# Patient Record
Sex: FEMALE | Race: WHITE | HISPANIC OR LATINO | Employment: STUDENT
[De-identification: names, ages, dates, MRNs, and addresses within clinical notes are randomized per-mention and may not be internally consistent; named-entity substitution may affect disease eponyms.]

---
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
## 2021-01-15 ENCOUNTER — BEHAVIORAL HEALTH (OUTPATIENT)
Dept: BEHAVIORAL HEALTH | Age: 21
End: 2021-01-15

## 2021-01-15 DIAGNOSIS — F41.1 GENERALIZED ANXIETY DISORDER: ICD-10-CM

## 2021-01-15 DIAGNOSIS — F33.0 MILD EPISODE OF RECURRENT MAJOR DEPRESSIVE DISORDER (CMD): Primary | ICD-10-CM

## 2021-01-15 PROCEDURE — 90837 PSYTX W PT 60 MINUTES: CPT | Performed by: SOCIAL WORKER

## 2021-01-27 ENCOUNTER — BEHAVIORAL HEALTH (OUTPATIENT)
Dept: BEHAVIORAL HEALTH | Age: 21
End: 2021-01-27

## 2021-01-27 DIAGNOSIS — F33.0 MILD EPISODE OF RECURRENT MAJOR DEPRESSIVE DISORDER (CMD): Primary | ICD-10-CM

## 2021-01-27 DIAGNOSIS — F41.1 GENERALIZED ANXIETY DISORDER: ICD-10-CM

## 2021-01-27 PROCEDURE — 90837 PSYTX W PT 60 MINUTES: CPT | Performed by: SOCIAL WORKER

## 2021-02-10 ENCOUNTER — BEHAVIORAL HEALTH (OUTPATIENT)
Dept: BEHAVIORAL HEALTH | Age: 21
End: 2021-02-10

## 2021-02-10 DIAGNOSIS — F33.0 MILD EPISODE OF RECURRENT MAJOR DEPRESSIVE DISORDER (CMD): Primary | ICD-10-CM

## 2021-02-10 DIAGNOSIS — F41.1 GENERALIZED ANXIETY DISORDER: ICD-10-CM

## 2021-02-10 PROCEDURE — 90834 PSYTX W PT 45 MINUTES: CPT | Performed by: SOCIAL WORKER

## 2021-03-03 ENCOUNTER — BEHAVIORAL HEALTH (OUTPATIENT)
Dept: BEHAVIORAL HEALTH | Age: 21
End: 2021-03-03

## 2021-03-03 DIAGNOSIS — F41.1 GENERALIZED ANXIETY DISORDER: ICD-10-CM

## 2021-03-03 DIAGNOSIS — F33.0 MILD EPISODE OF RECURRENT MAJOR DEPRESSIVE DISORDER (CMD): Primary | ICD-10-CM

## 2021-03-03 PROCEDURE — 90837 PSYTX W PT 60 MINUTES: CPT | Performed by: SOCIAL WORKER

## 2021-03-17 ENCOUNTER — BEHAVIORAL HEALTH (OUTPATIENT)
Dept: BEHAVIORAL HEALTH | Age: 21
End: 2021-03-17

## 2021-03-17 DIAGNOSIS — F33.0 MILD EPISODE OF RECURRENT MAJOR DEPRESSIVE DISORDER (CMD): Primary | ICD-10-CM

## 2021-03-17 DIAGNOSIS — F41.1 GENERALIZED ANXIETY DISORDER: ICD-10-CM

## 2021-03-17 PROCEDURE — 90837 PSYTX W PT 60 MINUTES: CPT | Performed by: SOCIAL WORKER

## 2021-04-16 ENCOUNTER — BEHAVIORAL HEALTH (OUTPATIENT)
Dept: BEHAVIORAL HEALTH | Age: 21
End: 2021-04-16

## 2021-04-16 DIAGNOSIS — F33.0 MILD EPISODE OF RECURRENT MAJOR DEPRESSIVE DISORDER (CMD): Primary | ICD-10-CM

## 2021-04-16 DIAGNOSIS — F41.1 GENERALIZED ANXIETY DISORDER: ICD-10-CM

## 2021-04-16 PROCEDURE — 90837 PSYTX W PT 60 MINUTES: CPT | Performed by: SOCIAL WORKER

## 2021-04-30 ENCOUNTER — BEHAVIORAL HEALTH (OUTPATIENT)
Dept: BEHAVIORAL HEALTH | Age: 21
End: 2021-04-30

## 2021-04-30 DIAGNOSIS — F33.0 MILD EPISODE OF RECURRENT MAJOR DEPRESSIVE DISORDER (CMD): Primary | ICD-10-CM

## 2021-04-30 DIAGNOSIS — F41.1 GENERALIZED ANXIETY DISORDER: ICD-10-CM

## 2021-04-30 PROCEDURE — 90837 PSYTX W PT 60 MINUTES: CPT | Performed by: SOCIAL WORKER

## 2021-05-14 ENCOUNTER — TELEPHONE (OUTPATIENT)
Dept: BEHAVIORAL HEALTH | Age: 21
End: 2021-05-14

## 2021-05-14 ENCOUNTER — BEHAVIORAL HEALTH (OUTPATIENT)
Dept: BEHAVIORAL HEALTH | Age: 21
End: 2021-05-14

## 2021-05-14 DIAGNOSIS — F33.0 MILD EPISODE OF RECURRENT MAJOR DEPRESSIVE DISORDER (CMD): Primary | ICD-10-CM

## 2021-05-14 DIAGNOSIS — F41.1 GENERALIZED ANXIETY DISORDER: ICD-10-CM

## 2021-05-14 PROCEDURE — 90837 PSYTX W PT 60 MINUTES: CPT | Performed by: SOCIAL WORKER

## 2021-05-25 VITALS
OXYGEN SATURATION: 97 % | DIASTOLIC BLOOD PRESSURE: 64 MMHG | RESPIRATION RATE: 16 BRPM | RESPIRATION RATE: 18 BRPM | SYSTOLIC BLOOD PRESSURE: 147 MMHG | TEMPERATURE: 97.9 F | DIASTOLIC BLOOD PRESSURE: 68 MMHG | SYSTOLIC BLOOD PRESSURE: 137 MMHG | OXYGEN SATURATION: 98 % | DIASTOLIC BLOOD PRESSURE: 64 MMHG | TEMPERATURE: 97.2 F | SYSTOLIC BLOOD PRESSURE: 134 MMHG | RESPIRATION RATE: 18 BRPM | HEART RATE: 92 BPM | HEART RATE: 84 BPM | OXYGEN SATURATION: 97 % | HEART RATE: 76 BPM | TEMPERATURE: 97.7 F

## 2021-06-11 ENCOUNTER — BEHAVIORAL HEALTH (OUTPATIENT)
Dept: BEHAVIORAL HEALTH | Age: 21
End: 2021-06-11

## 2021-06-11 DIAGNOSIS — F33.0 MILD EPISODE OF RECURRENT MAJOR DEPRESSIVE DISORDER (CMD): Primary | ICD-10-CM

## 2021-06-11 DIAGNOSIS — F41.1 GENERALIZED ANXIETY DISORDER: ICD-10-CM

## 2021-06-11 PROCEDURE — 90837 PSYTX W PT 60 MINUTES: CPT | Performed by: SOCIAL WORKER

## 2021-06-22 ENCOUNTER — BEHAVIORAL HEALTH (OUTPATIENT)
Dept: BEHAVIORAL HEALTH | Age: 21
End: 2021-06-22

## 2021-06-22 DIAGNOSIS — F33.0 MILD EPISODE OF RECURRENT MAJOR DEPRESSIVE DISORDER (CMD): Primary | ICD-10-CM

## 2021-06-22 DIAGNOSIS — F41.1 GENERALIZED ANXIETY DISORDER: ICD-10-CM

## 2021-06-22 PROCEDURE — 90837 PSYTX W PT 60 MINUTES: CPT | Performed by: SOCIAL WORKER

## 2021-07-06 ENCOUNTER — BEHAVIORAL HEALTH (OUTPATIENT)
Dept: BEHAVIORAL HEALTH | Age: 21
End: 2021-07-06

## 2021-07-06 DIAGNOSIS — F41.1 GENERALIZED ANXIETY DISORDER: ICD-10-CM

## 2021-07-06 DIAGNOSIS — F33.0 MILD EPISODE OF RECURRENT MAJOR DEPRESSIVE DISORDER (CMD): Primary | ICD-10-CM

## 2021-07-06 PROCEDURE — 90837 PSYTX W PT 60 MINUTES: CPT | Performed by: SOCIAL WORKER

## 2021-07-20 ENCOUNTER — BEHAVIORAL HEALTH (OUTPATIENT)
Dept: BEHAVIORAL HEALTH | Age: 21
End: 2021-07-20

## 2021-07-20 DIAGNOSIS — F41.1 GENERALIZED ANXIETY DISORDER: ICD-10-CM

## 2021-07-20 DIAGNOSIS — F33.0 MILD EPISODE OF RECURRENT MAJOR DEPRESSIVE DISORDER (CMD): Primary | ICD-10-CM

## 2021-07-20 PROCEDURE — 90837 PSYTX W PT 60 MINUTES: CPT | Performed by: SOCIAL WORKER

## 2021-07-24 ENCOUNTER — WALK IN (OUTPATIENT)
Dept: URGENT CARE | Age: 21
End: 2021-07-24

## 2021-07-24 VITALS
OXYGEN SATURATION: 98 % | RESPIRATION RATE: 18 BRPM | TEMPERATURE: 99.3 F | SYSTOLIC BLOOD PRESSURE: 137 MMHG | HEART RATE: 103 BPM | DIASTOLIC BLOOD PRESSURE: 82 MMHG

## 2021-07-24 DIAGNOSIS — M54.2 NECK PAIN: Primary | ICD-10-CM

## 2021-07-24 PROCEDURE — 99214 OFFICE O/P EST MOD 30 MIN: CPT | Performed by: FAMILY MEDICINE

## 2021-07-24 RX ORDER — TIZANIDINE 4 MG/1
4 TABLET ORAL EVERY 8 HOURS PRN
Qty: 15 TABLET | Refills: 0 | Status: SHIPPED | OUTPATIENT
Start: 2021-07-24 | End: 2021-07-29

## 2021-07-24 RX ORDER — PREDNISONE 20 MG/1
TABLET ORAL
Qty: 18 TABLET | Refills: 0 | Status: SHIPPED | OUTPATIENT
Start: 2021-07-24 | End: 2021-08-02

## 2021-07-24 ASSESSMENT — PAIN SCALES - GENERAL: PAINLEVEL: 4

## 2022-02-24 ENCOUNTER — WALK IN (OUTPATIENT)
Dept: URGENT CARE | Age: 22
End: 2022-02-24

## 2022-02-24 VITALS
DIASTOLIC BLOOD PRESSURE: 78 MMHG | OXYGEN SATURATION: 98 % | SYSTOLIC BLOOD PRESSURE: 151 MMHG | RESPIRATION RATE: 20 BRPM | TEMPERATURE: 99.1 F | HEART RATE: 95 BPM

## 2022-02-24 DIAGNOSIS — L70.0 CLOSED COMEDONE: Primary | ICD-10-CM

## 2022-02-24 PROCEDURE — 99214 OFFICE O/P EST MOD 30 MIN: CPT | Performed by: EMERGENCY MEDICINE

## 2022-02-24 RX ORDER — SULFAMETHOXAZOLE AND TRIMETHOPRIM 800; 160 MG/1; MG/1
1 TABLET ORAL 2 TIMES DAILY
Qty: 10 TABLET | Refills: 0 | Status: SHIPPED | OUTPATIENT
Start: 2022-02-24 | End: 2022-03-01

## 2022-02-24 ASSESSMENT — PAIN SCALES - GENERAL: PAINLEVEL: 4

## 2023-06-22 ENCOUNTER — WALK IN (OUTPATIENT)
Dept: URGENT CARE | Age: 23
End: 2023-06-22

## 2023-06-22 VITALS
OXYGEN SATURATION: 99 % | DIASTOLIC BLOOD PRESSURE: 63 MMHG | TEMPERATURE: 99.3 F | RESPIRATION RATE: 20 BRPM | HEART RATE: 116 BPM | SYSTOLIC BLOOD PRESSURE: 135 MMHG

## 2023-06-22 DIAGNOSIS — J02.9 PHARYNGITIS, UNSPECIFIED ETIOLOGY: ICD-10-CM

## 2023-06-22 DIAGNOSIS — J02.9 SORE THROAT: Primary | ICD-10-CM

## 2023-06-22 DIAGNOSIS — R50.9 FEVER, UNSPECIFIED FEVER CAUSE: ICD-10-CM

## 2023-06-22 LAB
FLUAV AG UPPER RESP QL IA.RAPID: NEGATIVE
FLUBV AG UPPER RESP QL IA.RAPID: NEGATIVE
INTERNAL PROCEDURAL CONTROLS ACCEPTABLE: YES
S PYO AG THROAT QL IA.RAPID: NEGATIVE
SARS-COV+SARS-COV-2 AG RESP QL IA.RAPID: NOT DETECTED
TEST LOT EXPIRATION DATE: NORMAL
TEST LOT EXPIRATION DATE: NORMAL
TEST LOT NUMBER: NORMAL
TEST LOT NUMBER: NORMAL

## 2023-06-22 PROCEDURE — 87428 SARSCOV & INF VIR A&B AG IA: CPT | Performed by: EMERGENCY MEDICINE

## 2023-06-22 PROCEDURE — 99214 OFFICE O/P EST MOD 30 MIN: CPT | Performed by: EMERGENCY MEDICINE

## 2023-06-22 PROCEDURE — 87880 STREP A ASSAY W/OPTIC: CPT | Performed by: EMERGENCY MEDICINE

## 2023-06-22 RX ORDER — AZITHROMYCIN 500 MG/1
500 TABLET, FILM COATED ORAL DAILY
Qty: 5 TABLET | Refills: 0 | Status: SHIPPED | OUTPATIENT
Start: 2023-06-22 | End: 2023-06-28 | Stop reason: ALTCHOICE

## 2023-06-22 RX ORDER — PREDNISONE 20 MG/1
40 TABLET ORAL DAILY
Qty: 10 TABLET | Refills: 0 | Status: SHIPPED | OUTPATIENT
Start: 2023-06-22 | End: 2023-06-27

## 2023-06-22 ASSESSMENT — PAIN SCALES - GENERAL: PAINLEVEL: 4

## 2023-06-28 ENCOUNTER — WALK IN (OUTPATIENT)
Dept: URGENT CARE | Age: 23
End: 2023-06-28

## 2023-06-28 VITALS
SYSTOLIC BLOOD PRESSURE: 136 MMHG | HEART RATE: 110 BPM | OXYGEN SATURATION: 96 % | DIASTOLIC BLOOD PRESSURE: 80 MMHG | TEMPERATURE: 98.8 F | RESPIRATION RATE: 16 BRPM

## 2023-06-28 DIAGNOSIS — K29.00 ACUTE GASTRITIS, PRESENCE OF BLEEDING UNSPECIFIED, UNSPECIFIED GASTRITIS TYPE: Primary | ICD-10-CM

## 2023-06-28 PROCEDURE — 99214 OFFICE O/P EST MOD 30 MIN: CPT | Performed by: FAMILY MEDICINE

## 2023-06-28 RX ORDER — ONDANSETRON 4 MG/1
4 TABLET, ORALLY DISINTEGRATING ORAL EVERY 8 HOURS PRN
Qty: 10 TABLET | Refills: 0 | Status: SHIPPED | OUTPATIENT
Start: 2023-06-28 | End: 2023-07-03

## 2023-06-28 RX ORDER — NICOTINE POLACRILEX 4 MG/1
20 GUM, CHEWING ORAL DAILY
Qty: 30 TABLET | Refills: 0 | Status: SHIPPED | OUTPATIENT
Start: 2023-06-28 | End: 2023-07-28

## 2023-06-28 ASSESSMENT — PAIN SCALES - GENERAL: PAINLEVEL: 5

## 2023-06-29 ASSESSMENT — ENCOUNTER SYMPTOMS
VOMITING: 0
ABDOMINAL PAIN: 1
SHORTNESS OF BREATH: 0
DIARRHEA: 0
CONSTIPATION: 0
FEVER: 0
NAUSEA: 1
COUGH: 0
FATIGUE: 0
CHILLS: 0

## 2023-06-30 ENCOUNTER — APPOINTMENT (OUTPATIENT)
Dept: ULTRASOUND IMAGING | Facility: HOSPITAL | Age: 23
End: 2023-06-30
Attending: EMERGENCY MEDICINE
Payer: COMMERCIAL

## 2023-06-30 ENCOUNTER — HOSPITAL ENCOUNTER (EMERGENCY)
Facility: HOSPITAL | Age: 23
Discharge: HOME | End: 2023-06-30
Attending: EMERGENCY MEDICINE
Payer: COMMERCIAL

## 2023-06-30 ENCOUNTER — APPOINTMENT (OUTPATIENT)
Dept: GENERAL RADIOLOGY | Facility: HOSPITAL | Age: 23
End: 2023-06-30
Attending: EMERGENCY MEDICINE
Payer: COMMERCIAL

## 2023-06-30 ENCOUNTER — HOSPITAL ENCOUNTER (OUTPATIENT)
Age: 23
Discharge: ANOTHER HOSPITAL | End: 2023-06-30
Attending: EMERGENCY MEDICINE
Payer: COMMERCIAL

## 2023-06-30 VITALS
OXYGEN SATURATION: 95 % | SYSTOLIC BLOOD PRESSURE: 149 MMHG | BODY MASS INDEX: 47.09 KG/M2 | HEART RATE: 78 BPM | DIASTOLIC BLOOD PRESSURE: 76 MMHG | RESPIRATION RATE: 18 BRPM | WEIGHT: 293 LBS | HEIGHT: 66 IN | TEMPERATURE: 98 F

## 2023-06-30 VITALS
WEIGHT: 293 LBS | RESPIRATION RATE: 26 BRPM | DIASTOLIC BLOOD PRESSURE: 63 MMHG | TEMPERATURE: 99 F | HEIGHT: 66 IN | BODY MASS INDEX: 47.09 KG/M2 | OXYGEN SATURATION: 94 % | HEART RATE: 106 BPM | SYSTOLIC BLOOD PRESSURE: 110 MMHG

## 2023-06-30 DIAGNOSIS — B27.90 INFECTIOUS MONONUCLEOSIS WITHOUT COMPLICATION, INFECTIOUS MONONUCLEOSIS DUE TO UNSPECIFIED ORGANISM: Primary | ICD-10-CM

## 2023-06-30 DIAGNOSIS — R10.13 EPIGASTRIC PAIN: ICD-10-CM

## 2023-06-30 DIAGNOSIS — R10.9 ABDOMINAL PAIN OF UNKNOWN ETIOLOGY: Primary | ICD-10-CM

## 2023-06-30 DIAGNOSIS — R50.9 FEVER, UNSPECIFIED FEVER CAUSE: ICD-10-CM

## 2023-06-30 LAB
ALBUMIN SERPL-MCNC: 3.2 G/DL (ref 3.4–5)
ALBUMIN/GLOB SERPL: 0.6 {RATIO} (ref 1–2)
ALP LIVER SERPL-CCNC: 321 U/L
ALT SERPL-CCNC: 276 U/L
ANION GAP SERPL CALC-SCNC: 9 MMOL/L (ref 0–18)
AST SERPL-CCNC: 140 U/L (ref 15–37)
B-HCG UR QL: NEGATIVE
BASOPHILS # BLD AUTO: 0.27 X10(3) UL (ref 0–0.2)
BASOPHILS NFR BLD AUTO: 1.4 %
BILIRUB SERPL-MCNC: 1.1 MG/DL (ref 0.1–2)
BILIRUB UR QL STRIP.AUTO: NEGATIVE
BUN BLD-MCNC: 8 MG/DL (ref 7–18)
CALCIUM BLD-MCNC: 9.1 MG/DL (ref 8.5–10.1)
CHLORIDE SERPL-SCNC: 108 MMOL/L (ref 98–112)
CO2 SERPL-SCNC: 22 MMOL/L (ref 21–32)
CREAT BLD-MCNC: 0.89 MG/DL
EOSINOPHIL # BLD AUTO: 0.04 X10(3) UL (ref 0–0.7)
EOSINOPHIL NFR BLD AUTO: 0.2 %
ERYTHROCYTE [DISTWIDTH] IN BLOOD BY AUTOMATED COUNT: 20 %
FLUAV + FLUBV RNA SPEC NAA+PROBE: NEGATIVE
FLUAV + FLUBV RNA SPEC NAA+PROBE: NEGATIVE
GFR SERPLBLD BASED ON 1.73 SQ M-ARVRAT: 95 ML/MIN/1.73M2 (ref 60–?)
GLOBULIN PLAS-MCNC: 5.2 G/DL (ref 2.8–4.4)
GLUCOSE BLD-MCNC: 106 MG/DL (ref 70–99)
GLUCOSE UR STRIP.AUTO-MCNC: NEGATIVE MG/DL
HCT VFR BLD AUTO: 40 %
HETEROPH AB SER QL: POSITIVE
HGB BLD-MCNC: 12.5 G/DL
IMM GRANULOCYTES # BLD AUTO: 0.17 X10(3) UL (ref 0–1)
IMM GRANULOCYTES NFR BLD: 0.9 %
KETONES UR STRIP.AUTO-MCNC: NEGATIVE MG/DL
LIPASE SERPL-CCNC: 50 U/L (ref 13–75)
LYMPHOCYTES # BLD AUTO: 15.15 X10(3) UL (ref 1–4)
LYMPHOCYTES NFR BLD AUTO: 76.3 %
MCH RBC QN AUTO: 22.3 PG (ref 26–34)
MCHC RBC AUTO-ENTMCNC: 31.3 G/DL (ref 31–37)
MCV RBC AUTO: 71.4 FL
MONOCYTES # BLD AUTO: 0.97 X10(3) UL (ref 0.1–1)
MONOCYTES NFR BLD AUTO: 4.9 %
NEUTROPHILS # BLD AUTO: 3.26 X10 (3) UL (ref 1.5–7.7)
NEUTROPHILS # BLD AUTO: 3.26 X10(3) UL (ref 1.5–7.7)
NEUTROPHILS NFR BLD AUTO: 16.3 %
NITRITE UR QL STRIP.AUTO: NEGATIVE
OSMOLALITY SERPL CALC.SUM OF ELEC: 287 MOSM/KG (ref 275–295)
PH UR STRIP.AUTO: 6 [PH] (ref 5–8)
PLATELET # BLD AUTO: 277 10(3)UL (ref 150–450)
POTASSIUM SERPL-SCNC: 4 MMOL/L (ref 3.5–5.1)
PROT SERPL-MCNC: 8.4 G/DL (ref 6.4–8.2)
PROT UR STRIP.AUTO-MCNC: NEGATIVE MG/DL
RBC # BLD AUTO: 5.6 X10(6)UL
RBC UR QL AUTO: NEGATIVE
RSV RNA SPEC NAA+PROBE: NEGATIVE
SARS-COV-2 RNA RESP QL NAA+PROBE: NOT DETECTED
SODIUM SERPL-SCNC: 139 MMOL/L (ref 136–145)
SP GR UR STRIP.AUTO: 1.01 (ref 1–1.03)
UROBILINOGEN UR STRIP.AUTO-MCNC: 2 MG/DL
WBC # BLD AUTO: 19.9 X10(3) UL (ref 4–11)

## 2023-06-30 PROCEDURE — 81025 URINE PREGNANCY TEST: CPT

## 2023-06-30 PROCEDURE — 99285 EMERGENCY DEPT VISIT HI MDM: CPT

## 2023-06-30 PROCEDURE — 85025 COMPLETE CBC W/AUTO DIFF WBC: CPT | Performed by: EMERGENCY MEDICINE

## 2023-06-30 PROCEDURE — 80053 COMPREHEN METABOLIC PANEL: CPT | Performed by: EMERGENCY MEDICINE

## 2023-06-30 PROCEDURE — 81001 URINALYSIS AUTO W/SCOPE: CPT

## 2023-06-30 PROCEDURE — 81001 URINALYSIS AUTO W/SCOPE: CPT | Performed by: EMERGENCY MEDICINE

## 2023-06-30 PROCEDURE — 86403 PARTICLE AGGLUT ANTBDY SCRN: CPT | Performed by: EMERGENCY MEDICINE

## 2023-06-30 PROCEDURE — 87086 URINE CULTURE/COLONY COUNT: CPT | Performed by: EMERGENCY MEDICINE

## 2023-06-30 PROCEDURE — 81002 URINALYSIS NONAUTO W/O SCOPE: CPT | Performed by: PHYSICIAN ASSISTANT

## 2023-06-30 PROCEDURE — 0241U SARS-COV-2/FLU A AND B/RSV BY PCR (GENEXPERT): CPT | Performed by: EMERGENCY MEDICINE

## 2023-06-30 PROCEDURE — 76700 US EXAM ABDOM COMPLETE: CPT | Performed by: EMERGENCY MEDICINE

## 2023-06-30 PROCEDURE — 71046 X-RAY EXAM CHEST 2 VIEWS: CPT | Performed by: EMERGENCY MEDICINE

## 2023-06-30 PROCEDURE — 83690 ASSAY OF LIPASE: CPT | Performed by: EMERGENCY MEDICINE

## 2023-06-30 PROCEDURE — 96360 HYDRATION IV INFUSION INIT: CPT

## 2023-06-30 RX ORDER — FAMOTIDINE 20 MG/1
20 TABLET, FILM COATED ORAL 2 TIMES DAILY PRN
Qty: 30 TABLET | Refills: 0 | Status: SHIPPED | OUTPATIENT
Start: 2023-06-30 | End: 2023-07-30

## 2023-06-30 RX ORDER — NICOTINE POLACRILEX 4 MG/1
20 GUM, CHEWING ORAL DAILY
COMMUNITY
Start: 2023-06-28 | End: 2023-07-28

## 2023-06-30 RX ORDER — ONDANSETRON 4 MG/1
1 TABLET, ORALLY DISINTEGRATING ORAL EVERY 8 HOURS PRN
COMMUNITY
Start: 2023-06-28 | End: 2023-07-03

## 2023-06-30 NOTE — ED INITIAL ASSESSMENT (HPI)
Pt presents with c/o of R/L upper abdominal pain for the past 2 weeks. Pt went to immediate care last week and was diagnosed with strep. Pt finished abx course. About a week ago, pt started with fevers as high as 102. Pt was sent from immediate care today to R/O gallbladder problems.

## 2023-06-30 NOTE — ED INITIAL ASSESSMENT (HPI)
Patient here with complaints of abdominal pain and intermittent fevers x 2 weeks with fevers low grade . States highest fever was 101-102 about 1 week ago when she also had a sore throat, sinus congestion. States she was seen twice in the last week by urgent care and has had multiple negative COVID, flu, and strep tests but they treated her for strep with z-sumeet and prednisone. States abdominal pain with movements/walking, lying on her stomach, and deep breath makes her feel like she has abdominal muscle spasms. States abdominal pain goes from the upper abdomen to the umbilical area. States she had a GI Cocktail about 2 days ago and the abdominal pain and fever resolved for about 1 day but then returned. Dr. Josafat Simmons at bedside assessing.

## 2023-07-04 ENCOUNTER — HOSPITAL ENCOUNTER (EMERGENCY)
Facility: HOSPITAL | Age: 23
Discharge: HOME | End: 2023-07-04
Attending: STUDENT IN AN ORGANIZED HEALTH CARE EDUCATION/TRAINING PROGRAM
Payer: COMMERCIAL

## 2023-07-04 VITALS
WEIGHT: 293 LBS | RESPIRATION RATE: 16 BRPM | HEIGHT: 66 IN | DIASTOLIC BLOOD PRESSURE: 76 MMHG | SYSTOLIC BLOOD PRESSURE: 138 MMHG | TEMPERATURE: 98 F | OXYGEN SATURATION: 97 % | HEART RATE: 97 BPM | BODY MASS INDEX: 47.09 KG/M2

## 2023-07-04 DIAGNOSIS — R74.01 TRANSAMINITIS: ICD-10-CM

## 2023-07-04 DIAGNOSIS — B27.90 INFECTIOUS MONONUCLEOSIS WITHOUT COMPLICATION, INFECTIOUS MONONUCLEOSIS DUE TO UNSPECIFIED ORGANISM: Primary | ICD-10-CM

## 2023-07-04 LAB
ALBUMIN SERPL-MCNC: 3.2 G/DL (ref 3.4–5)
ALBUMIN/GLOB SERPL: 0.7 {RATIO} (ref 1–2)
ALP LIVER SERPL-CCNC: 380 U/L
ALT SERPL-CCNC: 186 U/L
ANION GAP SERPL CALC-SCNC: 9 MMOL/L (ref 0–18)
AST SERPL-CCNC: 117 U/L (ref 15–37)
BASOPHILS # BLD AUTO: 0.39 X10(3) UL (ref 0–0.2)
BASOPHILS NFR BLD AUTO: 1.8 %
BILIRUB SERPL-MCNC: 0.7 MG/DL (ref 0.1–2)
BUN BLD-MCNC: 9 MG/DL (ref 7–18)
CALCIUM BLD-MCNC: 9.3 MG/DL (ref 8.5–10.1)
CHLORIDE SERPL-SCNC: 108 MMOL/L (ref 98–112)
CO2 SERPL-SCNC: 22 MMOL/L (ref 21–32)
CREAT BLD-MCNC: 0.87 MG/DL
EOSINOPHIL # BLD AUTO: 0.03 X10(3) UL (ref 0–0.7)
EOSINOPHIL NFR BLD AUTO: 0.1 %
ERYTHROCYTE [DISTWIDTH] IN BLOOD BY AUTOMATED COUNT: 21.4 %
GFR SERPLBLD BASED ON 1.73 SQ M-ARVRAT: 97 ML/MIN/1.73M2 (ref 60–?)
GLOBULIN PLAS-MCNC: 4.8 G/DL (ref 2.8–4.4)
GLUCOSE BLD-MCNC: 100 MG/DL (ref 70–99)
HCT VFR BLD AUTO: 41.3 %
HGB BLD-MCNC: 12.6 G/DL
IMM GRANULOCYTES # BLD AUTO: 0.19 X10(3) UL (ref 0–1)
IMM GRANULOCYTES NFR BLD: 0.9 %
LYMPHOCYTES # BLD AUTO: 17.56 X10(3) UL (ref 1–4)
LYMPHOCYTES NFR BLD AUTO: 79 %
MCH RBC QN AUTO: 22.1 PG (ref 26–34)
MCHC RBC AUTO-ENTMCNC: 30.5 G/DL (ref 31–37)
MCV RBC AUTO: 72.6 FL
MONOCYTES # BLD AUTO: 1.84 X10(3) UL (ref 0.1–1)
MONOCYTES NFR BLD AUTO: 8.3 %
NEUTROPHILS # BLD AUTO: 2.23 X10 (3) UL (ref 1.5–7.7)
NEUTROPHILS # BLD AUTO: 2.23 X10(3) UL (ref 1.5–7.7)
NEUTROPHILS NFR BLD AUTO: 9.9 %
OSMOLALITY SERPL CALC.SUM OF ELEC: 287 MOSM/KG (ref 275–295)
PLATELET # BLD AUTO: 275 10(3)UL (ref 150–450)
POTASSIUM SERPL-SCNC: 4 MMOL/L (ref 3.5–5.1)
PROT SERPL-MCNC: 8 G/DL (ref 6.4–8.2)
RBC # BLD AUTO: 5.69 X10(6)UL
SODIUM SERPL-SCNC: 139 MMOL/L (ref 136–145)
WBC # BLD AUTO: 22.2 X10(3) UL (ref 4–11)

## 2023-07-04 PROCEDURE — 36415 COLL VENOUS BLD VENIPUNCTURE: CPT

## 2023-07-04 PROCEDURE — 85025 COMPLETE CBC W/AUTO DIFF WBC: CPT | Performed by: STUDENT IN AN ORGANIZED HEALTH CARE EDUCATION/TRAINING PROGRAM

## 2023-07-04 PROCEDURE — 80053 COMPREHEN METABOLIC PANEL: CPT | Performed by: STUDENT IN AN ORGANIZED HEALTH CARE EDUCATION/TRAINING PROGRAM

## 2023-07-04 PROCEDURE — 99283 EMERGENCY DEPT VISIT LOW MDM: CPT

## 2023-07-04 NOTE — ED INITIAL ASSESSMENT (HPI)
PT states that she was contacted by the E.D. physician after her visit to this E.D. a few days ago.  PT states that she was diagnosed with Mononucleosis and that she was informed that additional blood work is to be completed at this facility

## 2023-07-21 RX ORDER — OMEPRAZOLE 20 MG/1
CAPSULE, DELAYED RELEASE ORAL
Qty: 30 CAPSULE | OUTPATIENT
Start: 2023-07-21

## 2023-08-01 ENCOUNTER — OFFICE VISIT (OUTPATIENT)
Facility: CLINIC | Age: 23
End: 2023-08-01
Payer: COMMERCIAL

## 2023-08-01 VITALS
DIASTOLIC BLOOD PRESSURE: 84 MMHG | BODY MASS INDEX: 47.09 KG/M2 | HEIGHT: 66 IN | SYSTOLIC BLOOD PRESSURE: 128 MMHG | HEART RATE: 111 BPM | WEIGHT: 293 LBS

## 2023-08-01 DIAGNOSIS — Z01.419 ENCOUNTER FOR WELL WOMAN EXAM WITH ROUTINE GYNECOLOGICAL EXAM: Primary | ICD-10-CM

## 2023-08-01 DIAGNOSIS — Z12.4 CERVICAL CANCER SCREENING: ICD-10-CM

## 2023-08-01 PROCEDURE — 3008F BODY MASS INDEX DOCD: CPT | Performed by: OBSTETRICS & GYNECOLOGY

## 2023-08-01 PROCEDURE — 87591 N.GONORRHOEAE DNA AMP PROB: CPT | Performed by: OBSTETRICS & GYNECOLOGY

## 2023-08-01 PROCEDURE — 87491 CHLMYD TRACH DNA AMP PROBE: CPT | Performed by: OBSTETRICS & GYNECOLOGY

## 2023-08-01 PROCEDURE — 3079F DIAST BP 80-89 MM HG: CPT | Performed by: OBSTETRICS & GYNECOLOGY

## 2023-08-01 PROCEDURE — 99385 PREV VISIT NEW AGE 18-39: CPT | Performed by: OBSTETRICS & GYNECOLOGY

## 2023-08-01 PROCEDURE — 3074F SYST BP LT 130 MM HG: CPT | Performed by: OBSTETRICS & GYNECOLOGY

## 2023-08-01 NOTE — PROGRESS NOTES
Ifeanyi Morris is a 26year old female  Patient's last menstrual period was 2023 (exact date). Patient presents with:  Wellness Visit: First GYNE visit  Irregular periods  . Patient has no complaints, menses every 1-2 months, declines b.c.    OBSTETRICS HISTORY:  OB History    Para Term  AB Living   0 0 0 0 0 0   SAB IAB Ectopic Multiple Live Births   0 0 0 0 0       GYNE HISTORY:  Periods irregular light      Sexual activity:   Yes      Partners:   Male      Birth control/ protection:   Condom        Pap Result Notes: no pap yet        MEDICAL HISTORY:  History reviewed. No pertinent past medical history.     SURGICAL HISTORY:  Past Surgical History:   Procedure Laterality Date    Tonsillectomy Bilateral        SOCIAL HISTORY:  Social History    Socioeconomic History      Marital status: Single      Spouse name: Not on file      Number of children: Not on file      Years of education: Not on file      Highest education level: Not on file    Occupational History      Not on file    Tobacco Use      Smoking status: Never      Smokeless tobacco: Never    Vaping Use      Vaping Use: Never used    Substance and Sexual Activity      Alcohol use: Yes        Comment: socially      Drug use: Never      Sexual activity: Yes        Partners: Male        Birth control/protection: Condom    Other Topics      Concerns:        Caffeine Concern: No        Exercise: Yes        Seat Belt: No        Special Diet: No        Stress Concern: Yes        Weight Concern: Yes    Social History Narrative      Not on file    Social Determinants of Health  Financial Resource Strain: Not on file  Food Insecurity: Not on file  Transportation Needs: Not on file  Physical Activity: Not on file  Stress: Not on file  Social Connections: Not on file  Housing Stability: Not on file    FAMILY HISTORY:  Family History   Problem Relation Age of Onset    Breast Cancer Paternal Aunt        MEDICATIONS:  No current outpatient medications on file. ALLERGIES:    Clavulanic Acid         HIVES    Comment:Mom is unsure if it was the augmentin or the food             (mushroons) she ate. Tested and not allergic to             PCN      Review of Systems:  Constitutional:  Denies fatigue, night sweats, hot flashes  Eyes:  denies blurred or double vision  Cardiovascular:  denies chest pain or palpitations  Respiratory:  denies shortness of breath  Gastrointestinal:  denies heartburn, abdominal pain, diarrhea or constipation  Genitourinary:  denies dysuria, incontinence, abnormal vaginal discharge, vaginal itching  Musculoskeletal:  denies back pain. Skin/Breast:  Denies any breast pain, lumps, or discharge. Neurological:  denies headaches, extremity weakness or numbness. Psychiatric: denies depression or anxiety. Endocrine:   denies excessive thirst or urination. Heme/Lymph:  denies history of anemia, easy bruising or bleeding. PHYSICAL EXAM:   Constitutional: well developed, well nourished  Head/Face: normocephalic  Neck/Thyroid: thyroid symmetric, no thyromegaly, no nodules, no adenopathy  Lymphatic:no abnormal supraclavicular or axillary adenopathy is noted  Breast: normal without palpable masses, tenderness, asymmetry, nipple discharge, nipple retraction or skin changes  Abdomen:  soft, nontender, nondistended, no masses  Skin/Hair: no unusual rashes or bruises  Extremities: no edema, no cyanosis  Psychiatric:  Oriented to time, place, person and situation.  Appropriate mood and affect    Pelvic Exam:  External Genitalia: normal appearance, hair distribution, and no lesions  Urethral Meatus:  normal in size, location, without lesions and prolapse  Bladder:  No fullness, masses or tenderness  Vagina:  Normal appearance without lesions, no abnormal discharge  Cervix:  Normal without tenderness on motion  Uterus: normal in size, contour, position, mobility, without tenderness  Adnexa: normal without masses or tenderness  Perineum: normal  Anus: no hemorroids     Assessment & Plan:  Diagnoses and all orders for this visit:    Encounter for well woman exam with routine gynecological exam  -     CHLAMYDIA/GONOCOCCUS, MIMI;  Future    Cervical cancer screening  -     THINPREP PAP WITH HPV REFLEX REQUEST B; Future

## 2023-08-02 LAB
C TRACH DNA SPEC QL NAA+PROBE: NEGATIVE
N GONORRHOEA DNA SPEC QL NAA+PROBE: NEGATIVE

## 2023-08-07 ENCOUNTER — LAB ENCOUNTER (OUTPATIENT)
Dept: LAB | Facility: HOSPITAL | Age: 23
End: 2023-08-07
Attending: OBSTETRICS & GYNECOLOGY
Payer: COMMERCIAL

## 2023-08-07 DIAGNOSIS — Z01.419 ENCOUNTER FOR WELL WOMAN EXAM WITH ROUTINE GYNECOLOGICAL EXAM: ICD-10-CM

## 2023-08-07 LAB
ALBUMIN SERPL-MCNC: 3.8 G/DL (ref 3.4–5)
ALBUMIN/GLOB SERPL: 0.8 {RATIO} (ref 1–2)
ALP LIVER SERPL-CCNC: 107 U/L
ALT SERPL-CCNC: 42 U/L
ANION GAP SERPL CALC-SCNC: 7 MMOL/L (ref 0–18)
AST SERPL-CCNC: 27 U/L (ref 15–37)
BASOPHILS # BLD AUTO: 0.06 X10(3) UL (ref 0–0.2)
BASOPHILS NFR BLD AUTO: 0.6 %
BILIRUB SERPL-MCNC: 0.6 MG/DL (ref 0.1–2)
BUN BLD-MCNC: 10 MG/DL (ref 7–18)
CALCIUM BLD-MCNC: 9.2 MG/DL (ref 8.5–10.1)
CHLORIDE SERPL-SCNC: 107 MMOL/L (ref 98–112)
CO2 SERPL-SCNC: 25 MMOL/L (ref 21–32)
CREAT BLD-MCNC: 0.8 MG/DL
EGFRCR SERPLBLD CKD-EPI 2021: 107 ML/MIN/1.73M2 (ref 60–?)
EOSINOPHIL # BLD AUTO: 0.16 X10(3) UL (ref 0–0.7)
EOSINOPHIL NFR BLD AUTO: 1.6 %
ERYTHROCYTE [DISTWIDTH] IN BLOOD BY AUTOMATED COUNT: 16.2 %
FASTING STATUS PATIENT QL REPORTED: YES
GLOBULIN PLAS-MCNC: 4.5 G/DL (ref 2.8–4.4)
GLUCOSE BLD-MCNC: 92 MG/DL (ref 70–99)
HCT VFR BLD AUTO: 40 %
HGB BLD-MCNC: 12.5 G/DL
IMM GRANULOCYTES # BLD AUTO: 0.04 X10(3) UL (ref 0–1)
IMM GRANULOCYTES NFR BLD: 0.4 %
LYMPHOCYTES # BLD AUTO: 4.19 X10(3) UL (ref 1–4)
LYMPHOCYTES NFR BLD AUTO: 41.6 %
MCH RBC QN AUTO: 22.3 PG (ref 26–34)
MCHC RBC AUTO-ENTMCNC: 31.3 G/DL (ref 31–37)
MCV RBC AUTO: 71.4 FL
MONOCYTES # BLD AUTO: 0.66 X10(3) UL (ref 0.1–1)
MONOCYTES NFR BLD AUTO: 6.6 %
NEUTROPHILS # BLD AUTO: 4.96 X10 (3) UL (ref 1.5–7.7)
NEUTROPHILS # BLD AUTO: 4.96 X10(3) UL (ref 1.5–7.7)
NEUTROPHILS NFR BLD AUTO: 49.2 %
OSMOLALITY SERPL CALC.SUM OF ELEC: 287 MOSM/KG (ref 275–295)
PLATELET # BLD AUTO: 472 10(3)UL (ref 150–450)
POTASSIUM SERPL-SCNC: 4.1 MMOL/L (ref 3.5–5.1)
PROT SERPL-MCNC: 8.3 G/DL (ref 6.4–8.2)
RBC # BLD AUTO: 5.6 X10(6)UL
SODIUM SERPL-SCNC: 139 MMOL/L (ref 136–145)
TSI SER-ACNC: 2.67 MIU/ML (ref 0.36–3.74)
WBC # BLD AUTO: 10.1 X10(3) UL (ref 4–11)

## 2023-08-07 PROCEDURE — 85025 COMPLETE CBC W/AUTO DIFF WBC: CPT

## 2023-08-07 PROCEDURE — 80053 COMPREHEN METABOLIC PANEL: CPT

## 2023-08-07 PROCEDURE — 36415 COLL VENOUS BLD VENIPUNCTURE: CPT

## 2023-08-07 PROCEDURE — 84443 ASSAY THYROID STIM HORMONE: CPT

## 2023-08-08 ENCOUNTER — TELEPHONE (OUTPATIENT)
Facility: CLINIC | Age: 23
End: 2023-08-08

## 2023-08-08 DIAGNOSIS — D69.1 ABNORMAL PLATELETS (HCC): Primary | ICD-10-CM

## 2023-08-08 DIAGNOSIS — D75.839 THROMBOCYTOSIS: ICD-10-CM

## 2023-08-08 NOTE — PROGRESS NOTES
Pt aware of results & recommendations to repeat CBC in 4 weeks. Order pended. Verbalized understanding.

## 2023-09-29 ENCOUNTER — APPOINTMENT (OUTPATIENT)
Dept: INTERNAL MEDICINE | Age: 23
End: 2023-09-29

## 2023-10-24 ENCOUNTER — OFFICE VISIT (OUTPATIENT)
Dept: INTERNAL MEDICINE CLINIC | Facility: CLINIC | Age: 23
End: 2023-10-24

## 2023-10-24 ENCOUNTER — APPOINTMENT (OUTPATIENT)
Dept: INTERNAL MEDICINE | Age: 23
End: 2023-10-24

## 2023-10-24 VITALS
BODY MASS INDEX: 47.65 KG/M2 | RESPIRATION RATE: 20 BRPM | HEIGHT: 65.75 IN | HEART RATE: 88 BPM | WEIGHT: 293 LBS | DIASTOLIC BLOOD PRESSURE: 70 MMHG | TEMPERATURE: 98 F | OXYGEN SATURATION: 97 % | SYSTOLIC BLOOD PRESSURE: 128 MMHG

## 2023-10-24 DIAGNOSIS — F41.9 ANXIETY: ICD-10-CM

## 2023-10-24 DIAGNOSIS — F50.81 BINGE EATING DISORDER: ICD-10-CM

## 2023-10-24 DIAGNOSIS — E66.01 CLASS 3 SEVERE OBESITY DUE TO EXCESS CALORIES WITHOUT SERIOUS COMORBIDITY WITH BODY MASS INDEX (BMI) OF 50.0 TO 59.9 IN ADULT (HCC): Primary | Chronic | ICD-10-CM

## 2023-10-24 DIAGNOSIS — D75.839 THROMBOCYTOSIS: ICD-10-CM

## 2023-10-24 DIAGNOSIS — R71.8 MICROCYTOSIS: ICD-10-CM

## 2023-10-24 PROBLEM — E66.813 CLASS 3 SEVERE OBESITY DUE TO EXCESS CALORIES WITHOUT SERIOUS COMORBIDITY WITH BODY MASS INDEX (BMI) OF 50.0 TO 59.9 IN ADULT: Status: ACTIVE | Noted: 2023-10-24

## 2023-10-24 PROBLEM — E66.813 CLASS 3 SEVERE OBESITY DUE TO EXCESS CALORIES WITHOUT SERIOUS COMORBIDITY WITH BODY MASS INDEX (BMI) OF 50.0 TO 59.9 IN ADULT: Chronic | Status: ACTIVE | Noted: 2023-10-24

## 2023-10-24 PROBLEM — E66.813 CLASS 3 SEVERE OBESITY DUE TO EXCESS CALORIES WITHOUT SERIOUS COMORBIDITY WITH BODY MASS INDEX (BMI) OF 50.0 TO 59.9 IN ADULT (HCC): Status: ACTIVE | Noted: 2023-10-24

## 2023-10-24 PROBLEM — E66.813 CLASS 3 SEVERE OBESITY DUE TO EXCESS CALORIES WITHOUT SERIOUS COMORBIDITY WITH BODY MASS INDEX (BMI) OF 50.0 TO 59.9 IN ADULT (HCC): Chronic | Status: ACTIVE | Noted: 2023-10-24

## 2023-10-24 PROCEDURE — 3078F DIAST BP <80 MM HG: CPT | Performed by: STUDENT IN AN ORGANIZED HEALTH CARE EDUCATION/TRAINING PROGRAM

## 2023-10-24 PROCEDURE — 99204 OFFICE O/P NEW MOD 45 MIN: CPT | Performed by: STUDENT IN AN ORGANIZED HEALTH CARE EDUCATION/TRAINING PROGRAM

## 2023-10-24 PROCEDURE — 3074F SYST BP LT 130 MM HG: CPT | Performed by: STUDENT IN AN ORGANIZED HEALTH CARE EDUCATION/TRAINING PROGRAM

## 2023-10-24 PROCEDURE — 3008F BODY MASS INDEX DOCD: CPT | Performed by: STUDENT IN AN ORGANIZED HEALTH CARE EDUCATION/TRAINING PROGRAM

## 2023-10-24 RX ORDER — FLUOXETINE 10 MG/1
10 CAPSULE ORAL DAILY
Qty: 30 CAPSULE | Refills: 0 | Status: SHIPPED | OUTPATIENT
Start: 2023-10-24 | End: 2023-11-23

## 2023-10-24 NOTE — PATIENT INSTRUCTIONS
Headspace charmaine for mindfulness, Yoga     Food diary for management of diet and to work through with therapist.     Fluoxetine for anxiety

## 2023-10-27 ENCOUNTER — LAB ENCOUNTER (OUTPATIENT)
Dept: LAB | Facility: HOSPITAL | Age: 23
End: 2023-10-27
Attending: STUDENT IN AN ORGANIZED HEALTH CARE EDUCATION/TRAINING PROGRAM

## 2023-10-27 DIAGNOSIS — D75.839 THROMBOCYTOSIS: ICD-10-CM

## 2023-10-27 DIAGNOSIS — E66.01 CLASS 3 SEVERE OBESITY DUE TO EXCESS CALORIES WITHOUT SERIOUS COMORBIDITY WITH BODY MASS INDEX (BMI) OF 50.0 TO 59.9 IN ADULT (HCC): Chronic | ICD-10-CM

## 2023-10-27 DIAGNOSIS — R71.8 MICROCYTOSIS: ICD-10-CM

## 2023-10-27 LAB
BASOPHILS # BLD AUTO: 0.05 X10(3) UL (ref 0–0.2)
BASOPHILS NFR BLD AUTO: 0.4 %
CHOLEST SERPL-MCNC: 145 MG/DL (ref ?–200)
DEPRECATED HBV CORE AB SER IA-ACNC: 33.4 NG/ML
EOSINOPHIL # BLD AUTO: 0.14 X10(3) UL (ref 0–0.7)
EOSINOPHIL NFR BLD AUTO: 1.2 %
ERYTHROCYTE [DISTWIDTH] IN BLOOD BY AUTOMATED COUNT: 18.2 %
FASTING PATIENT LIPID ANSWER: YES
HCT VFR BLD AUTO: 39.9 %
HDLC SERPL-MCNC: 38 MG/DL (ref 40–59)
HGB BLD-MCNC: 12.1 G/DL
IMM GRANULOCYTES # BLD AUTO: 0.07 X10(3) UL (ref 0–1)
IMM GRANULOCYTES NFR BLD: 0.6 %
IRON SATN MFR SERPL: 5 %
IRON SERPL-MCNC: 23 UG/DL
LDLC SERPL CALC-MCNC: 75 MG/DL (ref ?–100)
LYMPHOCYTES # BLD AUTO: 3.81 X10(3) UL (ref 1–4)
LYMPHOCYTES NFR BLD AUTO: 32.5 %
MCH RBC QN AUTO: 21.8 PG (ref 26–34)
MCHC RBC AUTO-ENTMCNC: 30.3 G/DL (ref 31–37)
MCV RBC AUTO: 72 FL
MONOCYTES # BLD AUTO: 0.66 X10(3) UL (ref 0.1–1)
MONOCYTES NFR BLD AUTO: 5.6 %
NEUTROPHILS # BLD AUTO: 6.99 X10 (3) UL (ref 1.5–7.7)
NEUTROPHILS # BLD AUTO: 6.99 X10(3) UL (ref 1.5–7.7)
NEUTROPHILS NFR BLD AUTO: 59.7 %
NONHDLC SERPL-MCNC: 107 MG/DL (ref ?–130)
PLATELET # BLD AUTO: 472 10(3)UL (ref 150–450)
RBC # BLD AUTO: 5.54 X10(6)UL
TIBC SERPL-MCNC: 434 UG/DL (ref 240–450)
TRANSFERRIN SERPL-MCNC: 291 MG/DL (ref 200–360)
TRIGL SERPL-MCNC: 191 MG/DL (ref 30–149)
VLDLC SERPL CALC-MCNC: 30 MG/DL (ref 0–30)
WBC # BLD AUTO: 11.7 X10(3) UL (ref 4–11)

## 2023-10-27 PROCEDURE — 83540 ASSAY OF IRON: CPT

## 2023-10-27 PROCEDURE — 36415 COLL VENOUS BLD VENIPUNCTURE: CPT

## 2023-10-27 PROCEDURE — 80061 LIPID PANEL: CPT

## 2023-10-27 PROCEDURE — 82728 ASSAY OF FERRITIN: CPT

## 2023-10-27 PROCEDURE — 83550 IRON BINDING TEST: CPT

## 2023-10-27 PROCEDURE — 85025 COMPLETE CBC W/AUTO DIFF WBC: CPT

## 2023-10-28 DIAGNOSIS — E61.1 IRON DEFICIENCY: ICD-10-CM

## 2023-10-28 DIAGNOSIS — D75.839 THROMBOCYTOSIS: Primary | ICD-10-CM

## 2023-11-16 PROBLEM — E61.1 IRON DEFICIENCY: Status: ACTIVE | Noted: 2023-11-16

## 2023-11-16 PROBLEM — R71.8 MICROCYTOSIS: Status: ACTIVE | Noted: 2023-11-16

## 2023-11-16 PROBLEM — F41.1 GAD (GENERALIZED ANXIETY DISORDER): Status: ACTIVE | Noted: 2023-11-16

## 2023-11-16 PROBLEM — D75.839 THROMBOCYTOSIS: Status: ACTIVE | Noted: 2023-11-16

## 2023-11-27 RX ORDER — FLUOXETINE 10 MG/1
10 CAPSULE ORAL DAILY
Qty: 90 CAPSULE | Refills: 0 | Status: SHIPPED | OUTPATIENT
Start: 2023-11-27

## 2023-11-27 RX ORDER — FLUOXETINE 10 MG/1
10 CAPSULE ORAL DAILY
Qty: 30 CAPSULE | Refills: 0 | OUTPATIENT
Start: 2023-11-27

## 2023-11-27 NOTE — TELEPHONE ENCOUNTER
Last OV relevant to medication: 11/16/23  Last refill date: 10/24/23 #30/refills: 0  When pt was asked to return for OV: 2/16/24  Upcoming appt/reason:   Future Appointments   Date Time Provider Nicole Ferrera   2/19/2024  1:30 PM Ventura Mckenzie MD [facility]   Was pt informed of any over due labs: due in [place]

## 2024-02-21 ENCOUNTER — LAB ENCOUNTER (OUTPATIENT)
Dept: LAB | Facility: HOSPITAL | Age: 24
End: 2024-02-21
Attending: STUDENT IN AN ORGANIZED HEALTH CARE EDUCATION/TRAINING PROGRAM
Payer: COMMERCIAL

## 2024-02-21 DIAGNOSIS — D69.1 ABNORMAL PLATELETS (HCC): ICD-10-CM

## 2024-02-21 DIAGNOSIS — E61.1 LOW IRON: ICD-10-CM

## 2024-02-21 DIAGNOSIS — E61.1 IRON DEFICIENCY: ICD-10-CM

## 2024-02-21 DIAGNOSIS — D75.839 THROMBOCYTOSIS: ICD-10-CM

## 2024-02-21 LAB
BASOPHILS # BLD AUTO: 0.05 X10(3) UL (ref 0–0.2)
BASOPHILS NFR BLD AUTO: 0.5 %
DEPRECATED HBV CORE AB SER IA-ACNC: 23.4 NG/ML
EOSINOPHIL # BLD AUTO: 0.15 X10(3) UL (ref 0–0.7)
EOSINOPHIL NFR BLD AUTO: 1.5 %
ERYTHROCYTE [DISTWIDTH] IN BLOOD BY AUTOMATED COUNT: 18 %
HCT VFR BLD AUTO: 39.8 %
HGB BLD-MCNC: 11.9 G/DL
IMM GRANULOCYTES # BLD AUTO: 0.05 X10(3) UL (ref 0–1)
IMM GRANULOCYTES NFR BLD: 0.5 %
IRON SATN MFR SERPL: 7 %
IRON SERPL-MCNC: 26 UG/DL
LYMPHOCYTES # BLD AUTO: 3.19 X10(3) UL (ref 1–4)
LYMPHOCYTES NFR BLD AUTO: 31.1 %
MCH RBC QN AUTO: 21.6 PG (ref 26–34)
MCHC RBC AUTO-ENTMCNC: 29.9 G/DL (ref 31–37)
MCV RBC AUTO: 72.4 FL
MONOCYTES # BLD AUTO: 0.62 X10(3) UL (ref 0.1–1)
MONOCYTES NFR BLD AUTO: 6 %
NEUTROPHILS # BLD AUTO: 6.2 X10 (3) UL (ref 1.5–7.7)
NEUTROPHILS # BLD AUTO: 6.2 X10(3) UL (ref 1.5–7.7)
NEUTROPHILS NFR BLD AUTO: 60.4 %
PLATELET # BLD AUTO: 489 10(3)UL (ref 150–450)
RBC # BLD AUTO: 5.5 X10(6)UL
TIBC SERPL-MCNC: 380 UG/DL (ref 240–450)
TRANSFERRIN SERPL-MCNC: 255 MG/DL (ref 200–360)
WBC # BLD AUTO: 10.3 X10(3) UL (ref 4–11)

## 2024-02-21 PROCEDURE — 82728 ASSAY OF FERRITIN: CPT

## 2024-02-21 PROCEDURE — 36415 COLL VENOUS BLD VENIPUNCTURE: CPT

## 2024-02-21 PROCEDURE — 83540 ASSAY OF IRON: CPT

## 2024-02-21 PROCEDURE — 85025 COMPLETE CBC W/AUTO DIFF WBC: CPT

## 2024-02-21 PROCEDURE — 83550 IRON BINDING TEST: CPT

## 2024-02-26 ENCOUNTER — LAB ENCOUNTER (OUTPATIENT)
Dept: LAB | Age: 24
End: 2024-02-26
Attending: STUDENT IN AN ORGANIZED HEALTH CARE EDUCATION/TRAINING PROGRAM
Payer: COMMERCIAL

## 2024-02-26 ENCOUNTER — OFFICE VISIT (OUTPATIENT)
Dept: INTERNAL MEDICINE CLINIC | Facility: CLINIC | Age: 24
End: 2024-02-26
Payer: COMMERCIAL

## 2024-02-26 VITALS
SYSTOLIC BLOOD PRESSURE: 118 MMHG | BODY MASS INDEX: 48.82 KG/M2 | DIASTOLIC BLOOD PRESSURE: 68 MMHG | TEMPERATURE: 97 F | WEIGHT: 293 LBS | OXYGEN SATURATION: 96 % | HEIGHT: 65 IN | HEART RATE: 106 BPM

## 2024-02-26 DIAGNOSIS — R10.13 DYSPEPSIA: ICD-10-CM

## 2024-02-26 DIAGNOSIS — D75.839 THROMBOCYTOSIS: ICD-10-CM

## 2024-02-26 DIAGNOSIS — K90.89 POOR IRON ABSORPTION (HCC): Primary | ICD-10-CM

## 2024-02-26 DIAGNOSIS — K21.9 GASTROESOPHAGEAL REFLUX DISEASE, UNSPECIFIED WHETHER ESOPHAGITIS PRESENT: ICD-10-CM

## 2024-02-26 DIAGNOSIS — E61.1 IRON DEFICIENCY: ICD-10-CM

## 2024-02-26 DIAGNOSIS — F41.9 ANXIETY: ICD-10-CM

## 2024-02-26 DIAGNOSIS — E66.01 CLASS 3 SEVERE OBESITY DUE TO EXCESS CALORIES WITHOUT SERIOUS COMORBIDITY WITH BODY MASS INDEX (BMI) OF 50.0 TO 59.9 IN ADULT (HCC): Chronic | ICD-10-CM

## 2024-02-26 DIAGNOSIS — K90.89 POOR IRON ABSORPTION (HCC): ICD-10-CM

## 2024-02-26 DIAGNOSIS — D50.8 IRON DEFICIENCY ANEMIA SECONDARY TO INADEQUATE DIETARY IRON INTAKE: ICD-10-CM

## 2024-02-26 LAB — IGA SERPL-MCNC: 192.4 MG/DL (ref 70–312)

## 2024-02-26 PROCEDURE — 82784 ASSAY IGA/IGD/IGG/IGM EACH: CPT

## 2024-02-26 PROCEDURE — 86364 TISS TRNSGLTMNASE EA IG CLAS: CPT

## 2024-02-26 PROCEDURE — 36415 COLL VENOUS BLD VENIPUNCTURE: CPT

## 2024-02-26 PROCEDURE — 83013 H PYLORI (C-13) BREATH: CPT | Performed by: STUDENT IN AN ORGANIZED HEALTH CARE EDUCATION/TRAINING PROGRAM

## 2024-02-26 RX ORDER — FLUOXETINE HYDROCHLORIDE 20 MG/1
20 CAPSULE ORAL DAILY
Qty: 90 CAPSULE | Refills: 0 | Status: SHIPPED | OUTPATIENT
Start: 2024-02-26

## 2024-02-26 RX ORDER — PANTOPRAZOLE SODIUM 40 MG/1
40 TABLET, DELAYED RELEASE ORAL
Qty: 90 TABLET | Refills: 0 | Status: SHIPPED | OUTPATIENT
Start: 2024-02-26

## 2024-02-26 RX ORDER — FLUOXETINE 10 MG/1
10 CAPSULE ORAL DAILY
Qty: 90 CAPSULE | Refills: 0 | OUTPATIENT
Start: 2024-02-26

## 2024-02-26 NOTE — PROGRESS NOTES
Chief Complaint   Patient presents with    Test Results     Was done on 2/21/24        SUBJECTIVE and A&P:  The patient is a 23 year old year old female with pmhx of obesity, LEXI, and iron deficiency presenting for medication follow-up. Last visit, started on fluoxetine 10mg daily. No side effects reported.     //Iron deficiency anemia   Has been taking her iron supplementation on a regular basis. No diarrhea on a daily basis. But notes if she eats too much dairy or spicy foods, will have diarrhea. Dairy especially. Was diagnosed with gluten intolerance as a child. Has noted that when she has a carb heavy does flare up GI symptoms. No longer on a gluten free or lactose free diet.   PLAN:   Likely etiology of SONIYA is poor absorption in the setting of reported gluten sensitivity and lactase deficiency. She expresses understanding and will follow a strict gluten free and lactose free diet in the meantime while increase iron dosage. Will check celiacs panel and hpylori breath testing today to rule out other causes of malabsorption. Recommended IV iron therapy, patient opted for Iron sulfate 325mg PO BID instead for now.   -H pylori breath testing.   -Celiacs panel ordered.   -Ferrous sulfate 325mg BID x 3 months  -Repeat Iron panel ordered for 3 months.     //GERD  //Dyspepsia  Does report acid reflux and has increased a lot lately. Not everyday, but does report it every other day. Reports that spicy foods definitely cause it to flare-up and acidic. No nauea, vomiting, bad acid taste. GERD symptoms include heart burn and feels like her gallbladder and stomach hurting like it did when she Mono and takes tums and get better. Burping a lot since Mono. October and November, waking up with burps and frequently.   PLAN:   -Recommend starting Pantoprazole 40mg daily x 6 weeks. Thereafter she can attempt to taper off. If persistent symptoms at 3 month follow-up, will refer to GI for EGD.   -OTC Bean-o for dyspepsia symptoms.      //Thrombocytosis  Denies symptoms of bleeding or clotting or rash.   PLAN:   Likely etiology is SONIYA. Did occur after mono symptoms. Will obtain abdominal ultrasound to ensure no evidence of splenomegaly. If not resolved after treatment of SONIYA, plan for hematology consultation.   -Abdominal Ultrasound ordered.     //LEXI  Going through more stress through school due to last semester.   PLAN:   LEXI has increased from 2 at last visit to 12. Patient feels that she is more stressed than usual. Feels that the medication seems to have now reached a threshold. Feels needs a higher dose at this time.   -Increase fluoxetine to 20mg daily. Will follow-up in 3 months, could try to taper back down to 10mg if doing well at that time.     //Class 3 obesity   Reports difficulty with diet control and exercise lately due to lack of motivation at times. Finds it helpful to have someone to check in to. Does see dietician who is currently on maternity leave so did not have that motivation. She is interested in weight loss clinic.   PLAN:   Some of her symptoms discussed today including GERD and dyspepsia can be attributable to recent weight gain. Her weight did go up 9lbs since last visit. Patient seems to do well with having frequent feedback and check-ins. Feel that weight loss clinic will be appropriate for her and may really help her. Not interested in medications right now, but may be open to further discussion.   -Weight loss clinic referral ordered    HEALTH MAINTENANCE:   -Vaccinations: Flu Refused, COVID Due  -Mammogram: Not indicated at this time.   -Pap Smear: 08/01/2023 negative, due 2026.   -Diabetes screening: Fasting glucose normal 8/2023  -Lipid screening: Cholesterol: 145, done on 10/27/2023.  HDL Cholesterol: 38, done on 10/27/2023.  LDL Cholesterol: 75, done on 10/27/2023.  TriGlycerides 191, done on 10/27/2023.  -Birth Control: Condom  -Smoking: None  -Alcohol: None  -Marijuana: None  -Recreational drug:  None    Return in about 3 months (around 5/26/2024) for SONIYA, anxiety, GERD follow-up .    Carmen Montana MD  Internal Medicine     Past Medical History:   Past Medical History:   Diagnosis Date    LEXI (generalized anxiety disorder) 11/16/2023    Transaminitis     2/2 Mononucleosis infection, resolved        Past Surgical History:   Past Surgical History:   Procedure Laterality Date    TONSILLECTOMY Bilateral        Current Medications:    Current Outpatient Medications   Medication Sig Dispense Refill    FLUoxetine 20 MG Oral Cap Take 1 capsule (20 mg total) by mouth daily. 90 capsule 0    pantoprazole 40 MG Oral Tab EC Take 1 tablet (40 mg total) by mouth every morning before breakfast. 90 tablet 0    FERROUS SULFATE OR Take 325 mg by mouth in the morning and 325 mg before bedtime.           DEPRESSION ASSESSMENT: 1. Little interest or pleasure in doing things: Not at all  2. Feeling down, depressed, or hopeless: Not at all  3. Trouble falling or staying asleep, or sleeping too much: More than half the days  4. Feeling tired or having little energy: Several days  5. Poor appetite or overeating: More than half the days  6. Feeling bad about yourself - or that you are a failure or have let yourself or your family down: Several days  7. Trouble concentrating on things, such as reading the newspaper or watching television: Nearly every day  8. Moving or speaking so slowly that other people could have noticed. Or the opposite - being so fidgety or restless that you have been moving around a lot more than usual: Not at all  9. Thoughts that you would be better off dead, or of hurting yourself in some way: Not at all  PHQ-9 TOTAL SCORE: 9         LEXI score: 12    PHYSICAL EXAM:   Wt Readings from Last 6 Encounters:   02/26/24 (!) 358 lb 12.8 oz (162.8 kg)   11/16/23 (!) 349 lb 3.2 oz (158.4 kg)   10/24/23 (!) 354 lb (160.6 kg)   08/01/23 (!) 343 lb 12.8 oz (155.9 kg)   07/04/23 (!) 350 lb (158.8 kg)   06/30/23 (!) 350 lb  (158.8 kg)     /68 (BP Location: Right arm, Patient Position: Sitting, Cuff Size: adult)   Pulse 106   Temp 97.3 °F (36.3 °C) (Temporal)   Ht 5' 5\" (1.651 m)   Wt (!) 358 lb 12.8 oz (162.8 kg)   LMP 11/16/2023 (Approximate)   SpO2 96%   BMI 59.71 kg/m²  Body mass index is 59.71 kg/m².   General: No acute distress. Alert and oriented x 3.  Psychiatric: Appropriate mood and affect.    LABS:   No results found for: \"A1C\"   Lab Results   Component Value Date    GLU 92 08/07/2023    BUN 10 08/07/2023    CREATSERUM 0.80 08/07/2023    ANIONGAP 7 08/07/2023    CA 9.2 08/07/2023    OSMOCALC 287 08/07/2023    ALKPHO 107 08/07/2023    AST 27 08/07/2023    ALT 42 08/07/2023    BILT 0.6 08/07/2023    TP 8.3 (H) 08/07/2023    ALB 3.8 08/07/2023    GLOBULIN 4.5 (H) 08/07/2023     08/07/2023    K 4.1 08/07/2023     08/07/2023    CO2 25.0 08/07/2023      Lab Results   Component Value Date    WBC 10.3 02/21/2024    RBC 5.50 (H) 02/21/2024    HGB 11.9 (L) 02/21/2024    HCT 39.8 02/21/2024    MCV 72.4 (L) 02/21/2024    MCH 21.6 (L) 02/21/2024    MCHC 29.9 (L) 02/21/2024    RDW 18.0 02/21/2024    .0 (H) 02/21/2024        IMAGING: None to review.

## 2024-02-26 NOTE — PATIENT INSTRUCTIONS
//Iron deficiency anemia   Take iron supplement twice a day. Suspect possibly related to constant bowel inflammation due to hx of celiac disease and lactose intolerance. Will recheck your celiac panel as well to ensure intolerance. Recommend following a strict gluten free diet and lactose free diet as well. Can use lactaid to help when eating lactose heavy foods. Will recheck your iron panel in 3 months to see if there is an improvement in your iron levels. If not, then we will have to do IV iron therapy to help replete your counts and will consider GI evaluation for further work-up.     //Heart burn   Your symptoms seem to correlate with heart burn. I recommend you start pantroprazole 40mg daily. I have ordered it. Take it on a daily basis. After 6 weeks, if your symptoms are better, you can start taking it more on an as needed basis. Try to look for food triggers and avoid those foods if possible. If your heartburn is not better despite acid reflux treatment or if you are not able to wean off the pantoprazole due to persistent acid reflux, then I will need to refer you to GI. We will have this reevaluation in 3 months. I will get back to you on your H pylori testing results.     //High platelets  I have ordered an abdominal ultrasound to look at your liver and spleen due to some of your GI symptoms and your elevated platelets. I suspect that your high platelets are secondary to iron deficiency and as we are not getting appropriate absorption your platelets have not returned to normal. We may need the blood doctor, hematologist, to weigh in to see why your platelets are high. This change in your blood levels occurred after you were diagnosed with Mono. Mono can cause an acute elevation in platelet count. But it should not be persistent.     //Burping  Likely etiology is your acid reflux. You can also try Bean-o which is an over the counter supplement that can help with the gut biome that causes excess gas and  burping.     //Anxiety   Increase fluoxetine dose to 20mg daily for now. Will readdress at 3 month follow-up and if doing well, can downtitrate again.

## 2024-02-27 LAB — H PYLORI BREATH TEST: NEGATIVE

## 2024-02-28 LAB — TTG IGA SER-ACNC: 0.3 U/ML (ref ?–7)

## 2024-04-03 ENCOUNTER — OFFICE VISIT (OUTPATIENT)
Dept: FAMILY MEDICINE CLINIC | Facility: CLINIC | Age: 24
End: 2024-04-03
Payer: COMMERCIAL

## 2024-04-03 VITALS
HEIGHT: 66 IN | DIASTOLIC BLOOD PRESSURE: 86 MMHG | BODY MASS INDEX: 47.09 KG/M2 | HEART RATE: 107 BPM | TEMPERATURE: 98 F | WEIGHT: 293 LBS | RESPIRATION RATE: 18 BRPM | SYSTOLIC BLOOD PRESSURE: 134 MMHG | OXYGEN SATURATION: 97 %

## 2024-04-03 DIAGNOSIS — J06.9 VIRAL UPPER RESPIRATORY TRACT INFECTION: ICD-10-CM

## 2024-04-03 DIAGNOSIS — R68.89 FLU-LIKE SYMPTOMS: ICD-10-CM

## 2024-04-03 DIAGNOSIS — J02.9 SORE THROAT: Primary | ICD-10-CM

## 2024-04-03 LAB
CONTROL LINE PRESENT WITH A CLEAR BACKGROUND (YES/NO): YES YES/NO
KIT LOT #: NORMAL NUMERIC

## 2024-04-03 PROCEDURE — 99213 OFFICE O/P EST LOW 20 MIN: CPT | Performed by: FAMILY MEDICINE

## 2024-04-03 PROCEDURE — 87637 SARSCOV2&INF A&B&RSV AMP PRB: CPT | Performed by: FAMILY MEDICINE

## 2024-04-03 PROCEDURE — 87880 STREP A ASSAY W/OPTIC: CPT | Performed by: FAMILY MEDICINE

## 2024-04-03 NOTE — PROGRESS NOTES
CHIEF COMPLAINT:     Chief Complaint   Patient presents with    Sore Throat     Symptoms started on Monday : sore throat, 100.5 fever ,  cough , and congestion , and sinus pressure.  No exposure            HPI:   Shannan Lindsey is a 23 year old female presents to clinic with complaints of sore throat, cough, congestion, ear pressure.  Reports + chills, + fever, + headache, no upset stomach, no ear pain, no rash, no diarrhea, no loss of smell/taste.    COVID exposure: none known  Sick contacts: none  COVID testing: none    Current Outpatient Medications   Medication Sig Dispense Refill    FLUoxetine 20 MG Oral Cap Take 1 capsule (20 mg total) by mouth daily. 90 capsule 0    pantoprazole 40 MG Oral Tab EC Take 1 tablet (40 mg total) by mouth every morning before breakfast. 90 tablet 0    FERROUS SULFATE OR Take 325 mg by mouth in the morning and 325 mg before bedtime.        Past Medical History:   Diagnosis Date    LEXI (generalized anxiety disorder) 11/16/2023    Transaminitis     2/2 Mononucleosis infection, resolved      Social History:  Social History     Socioeconomic History    Marital status: OTHER   Tobacco Use    Smoking status: Never     Passive exposure: Never    Smokeless tobacco: Never   Vaping Use    Vaping Use: Never used   Substance and Sexual Activity    Alcohol use: Not Currently     Comment: socially    Drug use: Never    Sexual activity: Yes     Partners: Male     Birth control/protection: Condom   Other Topics Concern    Caffeine Concern No    Exercise Yes    Seat Belt No    Special Diet No    Stress Concern Yes    Weight Concern Yes     Service No    Blood Transfusions No   Social History Narrative    Works at [employer]    Studying [place] at [employer]         REVIEW OF SYSTEMS:   GENERAL HEALTH: feels well otherwise, normal appetite  SKIN: denies any unusual skin lesions or rashes  HEENT: See HPI  RESPIRATORY: denies shortness of breath or wheezing  CARDIOVASCULAR: denies chest  pain or palpitations   GI: denies vomiting or diarrhea  NEURO: denies dizziness or lightheadedness    EXAM:   /86   Pulse 107   Temp 98.1 °F (36.7 °C) (Temporal)   Resp 18   Ht 5' 6\" (1.676 m)   Wt (!) 350 lb (158.8 kg)   LMP 03/18/2024 (Exact Date)   SpO2 97%   BMI 56.49 kg/m²   GENERAL: well developed, well nourished, in no apparent distress  SKIN: no rashes,no suspicious lesions  HEAD: atraumatic, normocephalic  EYES: conjunctiva clear  EARS: TM's clear, non-injected, no bulging, retraction, or fluid bilaterally  NOSE: nostrils patent, clear nasal mucus, nasal mucosa pink and boggy  THROAT: oral mucosa pink, moist. Posterior pharynx erythematous and injected. No exudates. Tonsils 2/4.  Uvula is midline.  Breath is not malodorous.  No trismus, hoarseness, muffled voice, or stridor.    NECK: supple  LUNGS: clear to auscultation bilaterally. Breathing is non labored.  CARDIO: RRR without murmur  EXTREMITIES: no cyanosis, clubbing or edema  LYMPH: no anterior cervical lymphadenopathy, no submandibular lymphadenopathy.  No  posterior cervical or occipital lymphadenopathy.    Recent Results (from the past 24 hour(s))   Strep A Assay W/Optic    Collection Time: 04/03/24 10:28 AM   Result Value Ref Range    Strep Grp A Screen neg Negative    Control Line Present with a clear background (yes/no) yes Yes/No    Kit Lot # [id number] Numeric    Kit Expiration Date 3/1/25 Date           ASSESSMENT AND PLAN:     Encounter Diagnoses   Name Primary?    Sore throat Yes    Flu-like symptoms     Viral upper respiratory tract infection        Orders Placed This Encounter   Procedures    Strep A Assay W/Optic    SARS-CoV-2/Flu A and B/RSV by PCR ([facility])       Meds & Refills for this Visit:  Requested Prescriptions      No prescriptions requested or ordered in this encounter       Imaging & Consults:  None     Testing as above.  Comfort measures explained.   Reviewed quarantine guidelines.    Follow up with PCP in 3-5 days  if not improving, condition worsens, or fever greater than or equal to 100.4 persists for 72 hours.    Verbalized understanding.    Patient Instructions   Your testing will be back in 24-36 hours.    Use OTC meds for comfort as needed--  Ibuprofen/Tylenol for fever/pain  Use Benadryl at bedtime to reduce drainage and promote rest.  Zyrtec/Claritin/Allegra in the AM to reduce nasal drainage without sedation.   Use saline nasal sprays to reduce congestion and thin secretions.   Use Delsym for cough.   Consider applying wilman's vapo-rub or eucayptus oil to chest and feet at bedtime to reduce chest and nasal congestion.   Warm tea with honey, cough lozenges, vaporizers/steam etc.    If no better in 2-3 days, follow-up with your PCP for further evaluation.

## 2024-04-03 NOTE — PATIENT INSTRUCTIONS
Your testing will be back in 24-36 hours.    Use OTC meds for comfort as needed--  Ibuprofen/Tylenol for fever/pain  Use Benadryl at bedtime to reduce drainage and promote rest.  Zyrtec/Claritin/Allegra in the AM to reduce nasal drainage without sedation.   Use saline nasal sprays to reduce congestion and thin secretions.   Use Delsym for cough.   Consider applying wilman's vapo-rub or eucayptus oil to chest and feet at bedtime to reduce chest and nasal congestion.   Warm tea with honey, cough lozenges, vaporizers/steam etc.    If no better in 2-3 days, follow-up with your PCP for further evaluation.

## 2024-04-05 LAB
FLUAV + FLUBV RNA SPEC NAA+PROBE: NEGATIVE
FLUAV + FLUBV RNA SPEC NAA+PROBE: NEGATIVE
RSV RNA SPEC NAA+PROBE: NEGATIVE
SARS-COV-2 RNA RESP QL NAA+PROBE: NOT DETECTED

## 2024-04-17 ENCOUNTER — HOSPITAL ENCOUNTER (OUTPATIENT)
Dept: ULTRASOUND IMAGING | Age: 24
Discharge: HOME | End: 2024-04-17
Attending: STUDENT IN AN ORGANIZED HEALTH CARE EDUCATION/TRAINING PROGRAM
Payer: COMMERCIAL

## 2024-04-17 DIAGNOSIS — D75.839 THROMBOCYTOSIS: ICD-10-CM

## 2024-04-17 PROCEDURE — 76705 ECHO EXAM OF ABDOMEN: CPT | Performed by: STUDENT IN AN ORGANIZED HEALTH CARE EDUCATION/TRAINING PROGRAM

## 2024-05-28 RX ORDER — FLUOXETINE HYDROCHLORIDE 20 MG/1
20 CAPSULE ORAL DAILY
Qty: 90 CAPSULE | Refills: 0 | Status: SHIPPED | OUTPATIENT
Start: 2024-05-28

## 2024-05-28 NOTE — TELEPHONE ENCOUNTER
Psychiatric Non-Scheduled (Anti-Anxiety) [id number]/26/2024 07:10 AM   Protocol Details In person appointment or virtual visit in the past 6 mos or appointment in next 3 mos    Depression Screening completed within the past 12 months        Future Appointments   Date Time Provider Department Center   6/3/2024 11:00 AM Carmen oMntana MD [facility] [place]   9/24/2024  9:00 AM Sana Washington MD [facility]

## 2024-06-06 ENCOUNTER — OFFICE VISIT (OUTPATIENT)
Dept: INTERNAL MEDICINE CLINIC | Facility: CLINIC | Age: 24
End: 2024-06-06
Payer: COMMERCIAL

## 2024-06-06 VITALS
OXYGEN SATURATION: 98 % | HEART RATE: 100 BPM | BODY MASS INDEX: 47.09 KG/M2 | SYSTOLIC BLOOD PRESSURE: 128 MMHG | HEIGHT: 66 IN | RESPIRATION RATE: 21 BRPM | DIASTOLIC BLOOD PRESSURE: 80 MMHG | WEIGHT: 293 LBS | TEMPERATURE: 98 F

## 2024-06-06 DIAGNOSIS — G57.21 ANTERIOR FEMORAL CUTANEOUS NEUROPATHY OF RIGHT LOWER EXTREMITY: ICD-10-CM

## 2024-06-06 DIAGNOSIS — R63.5 WEIGHT GAIN: ICD-10-CM

## 2024-06-06 DIAGNOSIS — R23.2 FACIAL FLUSHING: ICD-10-CM

## 2024-06-06 DIAGNOSIS — R61 EXCESSIVE SWEATING: ICD-10-CM

## 2024-06-06 DIAGNOSIS — M76.31 ILIOTIBIAL BAND SYNDROME OF RIGHT SIDE: Primary | ICD-10-CM

## 2024-06-06 PROBLEM — E66.01 MORBID OBESITY (HCC): Status: ACTIVE | Noted: 2023-10-24

## 2024-06-06 PROCEDURE — 99214 OFFICE O/P EST MOD 30 MIN: CPT | Performed by: STUDENT IN AN ORGANIZED HEALTH CARE EDUCATION/TRAINING PROGRAM

## 2024-06-06 NOTE — PROGRESS NOTES
Chief Complaint   Patient presents with    Follow - Up     3 month follow up GERD, anxiety,SONIYA   Still has    Mild heartburn/ anxiety been control and stable sinice on med           SUBJECTIVE and A&P:  The patient is a 23 year old year old female with pmhx of obesity, LEXI, and iron deficiency presenting for medication follow-up.     //IT band syndrome  vs. Lateral femoral cutaneous nerve entrapment, R  R outer thigh goes numb. And then yesterday had sharp pain in the back pain is flaring up recently. Pain lasted a couple of minutes. Felt almost like a alecia horse. Still kind of numb right now, and muscle fels soure. Did not feel thigh was tight. Water intake pretty good. Does report pain when she presses in the area.   PLAN:   Straight leg negative. Pain at the site of the IT band and noting numbness along the top of the leg as well. Ddx includes lateral femoral cutaneous nerve entrapment vs. IT band syndrome. High suspicion for lateral femoral cutaneous nerve entrapment with report of numbness, back pain, lateral leg pain and recent weight gain. Recommend stretches in the mean time with PT consulted for management. Recommend weight loss and loose fitted clothing to prevent compression.   -PT consulted, appreciate recs.     //Facial flushing and sweating   //Weight gain  When had Mono would get severe hot flashes, since then still getting them occasionally, but not as severe and only in the face. Occuring also in the winter. Difference in temperature also triggers it. No joint pain or swelling. Does report was having night sweats initially, but not any more. Menses are normal and more regular actually than before. Last year was not having normal menses. Face gets really red as well.  No joint pain, no weight loss,  rashes, shortness of breath, chest pain. Denies hx of rosacea. Patient does not have a picture to show.   PLAN:   Ddx includes thyroid dysfunction, cushings, autoimmune, carcinoid syndrome. Less likely  pheochromocytoma without blood pressure issues, rosacea without common skin changes on exam. Carcinoid syndrome is a possibility, but would rule out common conditions first. If all negative, then will work-up for carcinoid syndrome. Less likely fluoxetine as this has been happening before she was started on fluoxetine. Patient expresses understanding and will obtain lab work-up.   -TSH, ERIKA, morning cortisol, vitamin B12, folic acid ordered.   -Will consider 5HIAA urine 24hr collection if all other labs negative.     //Iron deficiency anemia   Has been taking her iron supplementation on a regular basis. No diarrhea on a daily basis. But notes if she eats too much dairy or spicy foods, will have diarrhea. Has been following a gluten free diet and reports bloating and GERD are much better.   PLAN:   Likely etiology of SONIYA is poor absorption in the setting of reported gluten sensitivity and lactase deficiency. She expresses understanding and will follow a strict gluten free and lactose free diet. On BID iron supplementation. Celiac panel and hpylori negative. Recommended IV iron therapy, patient opted for Iron sulfate 325mg PO BID instead for now. Recheck pending.   -Ferrous sulfate 325mg BID.  -Repeat Iron panel pending.     //GERD  //Dyspepsia  Heart burn much better now. Now not getting it or is able to identify her triggers when it has. Feels her diet has changed by trying to stay gluten free because it helped her symptoms. Noting that when she having gluten she is more gassy and burping more often. PLAN:   Did not start omeprazole. Symptoms much better now with lifestlye changes.   -OTC Bean-o for dyspepsia symptoms.     //Thrombocytosis  Denies symptoms of bleeding or clotting or rash.   PLAN:   Likely etiology is SONIYA. Did occur after mono symptoms. Abdominal ultrasound negative for splenomegaly. If not resolved after treatment of SONIYA, plan for hematology consultation.   -CBC pending.     //LEXI  No longer having  stress right now as school is on hold. Wondering if she can stop the medication. Will start back up in school soon. Not having any side effects.   PLAN:   Discussed not stopping this medication without tapering. She will be going back to school and feels she will need the medication then. Prefers to stay on the medication at this time as will go back to school and will have a hard time with anxiety again. She is working with therapy.   -Continue fluoxetine to 20mg daily  -Continue working with therapy.     //Morbid obesity  Reports difficulty with diet control and exercise lately due to recent vacation. Weight loss clinic appointment set up in September.    PLAN:    Feel that weight loss clinic will be appropriate for her and may really help her. Will think about medication. Provided information on zepbound and wegovy.   -Weight loss clinic appointment scheduled.     HEALTH MAINTENANCE:   -Vaccinations: Flu Refused, COVID Due  -Mammogram: Not indicated at this time.   -Pap Smear: 08/01/2023 negative, due 2026.   -Diabetes screening: Fasting glucose normal 8/2023  -Lipid screening: Cholesterol: 145, done on 10/27/2023.  HDL Cholesterol: 38, done on 10/27/2023.  LDL Cholesterol: 75, done on 10/27/2023.  TriGlycerides 191, done on 10/27/2023.  -Birth Control: Condom  -Smoking: None  -Alcohol: None  -Marijuana: None  -Recreational drug: None    Return in about 4 weeks (around 7/4/2024) for Physical exam .    Camren Montana MD  Internal Medicine     Past Medical History:   Past Medical History:    LEXI (generalized anxiety disorder)    Transaminitis    2/2 Mononucleosis infection, resolved        Past Surgical History:   Past Surgical History:   Procedure Laterality Date    Tonsillectomy Bilateral        Current Medications:    Current Outpatient Medications   Medication Sig Dispense Refill    FLUOXETINE 20 MG Oral Cap TAKE 1 CAPSULE BY MOUTH EVERY DAY 90 capsule 0    FERROUS SULFATE OR Take 325 mg by mouth in the morning  and 325 mg before bedtime.           DEPRESSION ASSESSMENT: 1. Little interest or pleasure in doing things: Not at all  2. Feeling down, depressed, or hopeless: Several days  3. Trouble falling or staying asleep, or sleeping too much: More than half the days  4. Feeling tired or having little energy: Several days  5. Poor appetite or overeating: Nearly every day  6. Feeling bad about yourself - or that you are a failure or have let yourself or your family down: Not at all  7. Trouble concentrating on things, such as reading the newspaper or watching television: Not at all  8. Moving or speaking so slowly that other people could have noticed. Or the opposite - being so fidgety or restless that you have been moving around a lot more than usual: Not at all  9. Thoughts that you would be better off dead, or of hurting yourself in some way: Not at all  PHQ-9 TOTAL SCORE: 7  If you checked off any problems, how difficult have these problems made it for you to do your work, take care of things at home, or get along with other people?: Somewhat difficult      LEXI score: 12    PHYSICAL EXAM:   Wt Readings from Last 6 Encounters:   06/06/24 (!) 374 lb (169.6 kg)   04/03/24 (!) 350 lb (158.8 kg)   02/26/24 (!) 358 lb 12.8 oz (162.8 kg)   11/16/23 (!) 349 lb 3.2 oz (158.4 kg)   10/24/23 (!) 354 lb (160.6 kg)   08/01/23 (!) 343 lb 12.8 oz (155.9 kg)     /80 (BP Location: Right arm, Patient Position: Sitting, Cuff Size: large)   Pulse 100   Temp 98 °F (36.7 °C)   Resp 21   Ht 5' 6\" (1.676 m)   Wt (!) 374 lb (169.6 kg)   LMP 06/03/2024 (Exact Date)   SpO2 98%   BMI 60.37 kg/m²  Body mass index is 60.37 kg/m².   General: No acute distress. Alert and oriented x 3.  MSK: Straight leg negative on both sites. Tenderness to palpation above the R knee laterally. Pain along the IT band as well. No swollen joints, edema noted.   Psychiatric: Appropriate mood and affect.    LABS:   No results found for: \"A1C\"   Lab Results    Component Value Date    GLU 92 08/07/2023    BUN 10 08/07/2023    CREATSERUM 0.80 08/07/2023    ANIONGAP 7 08/07/2023    CA 9.2 08/07/2023    OSMOCALC 287 08/07/2023    ALKPHO 107 08/07/2023    AST 27 08/07/2023    ALT 42 08/07/2023    BILT 0.6 08/07/2023    TP 8.3 (H) 08/07/2023    ALB 3.8 08/07/2023    GLOBULIN 4.5 (H) 08/07/2023     08/07/2023    K 4.1 08/07/2023     08/07/2023    CO2 25.0 08/07/2023      Lab Results   Component Value Date    WBC 10.3 02/21/2024    RBC 5.50 (H) 02/21/2024    HGB 11.9 (L) 02/21/2024    HCT 39.8 02/21/2024    MCV 72.4 (L) 02/21/2024    MCH 21.6 (L) 02/21/2024    MCHC 29.9 (L) 02/21/2024    RDW 18.0 02/21/2024    .0 (H) 02/21/2024        IMAGING: None to review.

## 2024-06-06 NOTE — PATIENT INSTRUCTIONS
Get labs done including TSH, ERIKA, morning cortisol, and iron studies. These testing need to be done prior to 8AM.

## 2024-07-11 ENCOUNTER — LAB ENCOUNTER (OUTPATIENT)
Dept: LAB | Facility: HOSPITAL | Age: 24
End: 2024-07-11
Attending: STUDENT IN AN ORGANIZED HEALTH CARE EDUCATION/TRAINING PROGRAM
Payer: COMMERCIAL

## 2024-07-11 DIAGNOSIS — R61 EXCESSIVE SWEATING: ICD-10-CM

## 2024-07-11 DIAGNOSIS — R23.2 FACIAL FLUSHING: ICD-10-CM

## 2024-07-11 DIAGNOSIS — K90.89 POOR IRON ABSORPTION (HCC): ICD-10-CM

## 2024-07-11 DIAGNOSIS — R63.5 WEIGHT GAIN: ICD-10-CM

## 2024-07-11 DIAGNOSIS — D75.839 THROMBOCYTOSIS: ICD-10-CM

## 2024-07-11 DIAGNOSIS — D50.8 IRON DEFICIENCY ANEMIA SECONDARY TO INADEQUATE DIETARY IRON INTAKE: ICD-10-CM

## 2024-07-11 LAB
ALBUMIN SERPL-MCNC: 3.5 G/DL (ref 3.4–5)
ALBUMIN/GLOB SERPL: 0.9 {RATIO} (ref 1–2)
ALP LIVER SERPL-CCNC: 86 U/L
ALT SERPL-CCNC: 30 U/L
ANION GAP SERPL CALC-SCNC: 6 MMOL/L (ref 0–18)
AST SERPL-CCNC: 11 U/L (ref 15–37)
BASOPHILS # BLD AUTO: 0.07 X10(3) UL (ref 0–0.2)
BASOPHILS NFR BLD AUTO: 0.6 %
BILIRUB SERPL-MCNC: 0.4 MG/DL (ref 0.1–2)
BUN BLD-MCNC: 9 MG/DL (ref 9–23)
CALCIUM BLD-MCNC: 9 MG/DL (ref 8.5–10.1)
CHLORIDE SERPL-SCNC: 110 MMOL/L (ref 98–112)
CO2 SERPL-SCNC: 26 MMOL/L (ref 21–32)
CORTIS SERPL-MCNC: 12.2 UG/DL
CREAT BLD-MCNC: 0.76 MG/DL
DEPRECATED HBV CORE AB SER IA-ACNC: 34.5 NG/ML
EGFRCR SERPLBLD CKD-EPI 2021: 113 ML/MIN/1.73M2 (ref 60–?)
EOSINOPHIL # BLD AUTO: 0.28 X10(3) UL (ref 0–0.7)
EOSINOPHIL NFR BLD AUTO: 2.6 %
ERYTHROCYTE [DISTWIDTH] IN BLOOD BY AUTOMATED COUNT: 17.2 %
FASTING STATUS PATIENT QL REPORTED: YES
FOLATE SERPL-MCNC: 14.9 NG/ML (ref 8.7–?)
GLOBULIN PLAS-MCNC: 3.8 G/DL (ref 2.8–4.4)
GLUCOSE BLD-MCNC: 98 MG/DL (ref 70–99)
HCT VFR BLD AUTO: 39.8 %
HGB BLD-MCNC: 12.2 G/DL
IMM GRANULOCYTES # BLD AUTO: 0.06 X10(3) UL (ref 0–1)
IMM GRANULOCYTES NFR BLD: 0.6 %
IRON SATN MFR SERPL: 7 %
IRON SERPL-MCNC: 30 UG/DL
LYMPHOCYTES # BLD AUTO: 3.25 X10(3) UL (ref 1–4)
LYMPHOCYTES NFR BLD AUTO: 29.8 %
MCH RBC QN AUTO: 23.2 PG (ref 26–34)
MCHC RBC AUTO-ENTMCNC: 30.7 G/DL (ref 31–37)
MCV RBC AUTO: 75.7 FL
MONOCYTES # BLD AUTO: 0.59 X10(3) UL (ref 0.1–1)
MONOCYTES NFR BLD AUTO: 5.4 %
NEUTROPHILS # BLD AUTO: 6.65 X10 (3) UL (ref 1.5–7.7)
NEUTROPHILS # BLD AUTO: 6.65 X10(3) UL (ref 1.5–7.7)
NEUTROPHILS NFR BLD AUTO: 61 %
OSMOLALITY SERPL CALC.SUM OF ELEC: 293 MOSM/KG (ref 275–295)
PLATELET # BLD AUTO: 461 10(3)UL (ref 150–450)
POTASSIUM SERPL-SCNC: 4.4 MMOL/L (ref 3.5–5.1)
PROT SERPL-MCNC: 7.3 G/DL (ref 6.4–8.2)
RBC # BLD AUTO: 5.26 X10(6)UL
SODIUM SERPL-SCNC: 142 MMOL/L (ref 136–145)
TIBC SERPL-MCNC: 429 UG/DL (ref 240–450)
TRANSFERRIN SERPL-MCNC: 288 MG/DL (ref 200–360)
TSI SER-ACNC: 2.97 MIU/ML (ref 0.36–3.74)
VIT B12 SERPL-MCNC: 449 PG/ML (ref 193–986)
WBC # BLD AUTO: 10.9 X10(3) UL (ref 4–11)

## 2024-07-11 PROCEDURE — 86038 ANTINUCLEAR ANTIBODIES: CPT

## 2024-07-11 PROCEDURE — 86225 DNA ANTIBODY NATIVE: CPT

## 2024-07-11 PROCEDURE — 80053 COMPREHEN METABOLIC PANEL: CPT

## 2024-07-11 PROCEDURE — 82728 ASSAY OF FERRITIN: CPT

## 2024-07-11 PROCEDURE — 83550 IRON BINDING TEST: CPT

## 2024-07-11 PROCEDURE — 82746 ASSAY OF FOLIC ACID SERUM: CPT

## 2024-07-11 PROCEDURE — 82607 VITAMIN B-12: CPT

## 2024-07-11 PROCEDURE — 83540 ASSAY OF IRON: CPT

## 2024-07-11 PROCEDURE — 84443 ASSAY THYROID STIM HORMONE: CPT

## 2024-07-11 PROCEDURE — 36415 COLL VENOUS BLD VENIPUNCTURE: CPT

## 2024-07-11 PROCEDURE — 82533 TOTAL CORTISOL: CPT

## 2024-07-11 PROCEDURE — 85025 COMPLETE CBC W/AUTO DIFF WBC: CPT

## 2024-07-12 LAB
DSDNA IGG SERPL IA-ACNC: 0.9 IU/ML
ENA AB SER QL IA: 0.2 UG/L
ENA AB SER QL IA: NEGATIVE

## 2024-07-16 ENCOUNTER — TELEPHONE (OUTPATIENT)
Dept: INTERNAL MEDICINE CLINIC | Facility: CLINIC | Age: 24
End: 2024-07-16

## 2024-07-16 ENCOUNTER — OFFICE VISIT (OUTPATIENT)
Dept: INTERNAL MEDICINE CLINIC | Facility: CLINIC | Age: 24
End: 2024-07-16
Payer: COMMERCIAL

## 2024-07-16 VITALS
TEMPERATURE: 98 F | SYSTOLIC BLOOD PRESSURE: 122 MMHG | RESPIRATION RATE: 20 BRPM | WEIGHT: 293 LBS | HEIGHT: 66 IN | HEART RATE: 80 BPM | DIASTOLIC BLOOD PRESSURE: 70 MMHG | OXYGEN SATURATION: 98 % | BODY MASS INDEX: 47.09 KG/M2

## 2024-07-16 DIAGNOSIS — R71.8 MICROCYTOSIS: ICD-10-CM

## 2024-07-16 DIAGNOSIS — E78.49 OTHER HYPERLIPIDEMIA: ICD-10-CM

## 2024-07-16 DIAGNOSIS — E61.1 IRON DEFICIENCY: ICD-10-CM

## 2024-07-16 DIAGNOSIS — F41.1 GAD (GENERALIZED ANXIETY DISORDER): ICD-10-CM

## 2024-07-16 DIAGNOSIS — D75.839 THROMBOCYTOSIS: ICD-10-CM

## 2024-07-16 DIAGNOSIS — E66.01 MORBID OBESITY (HCC): ICD-10-CM

## 2024-07-16 DIAGNOSIS — Z00.00 PHYSICAL EXAM, ANNUAL: Primary | ICD-10-CM

## 2024-07-16 DIAGNOSIS — D50.0 IRON DEFICIENCY ANEMIA DUE TO CHRONIC BLOOD LOSS: ICD-10-CM

## 2024-07-16 PROBLEM — E78.1 HYPERTRIGLYCERIDEMIA: Status: ACTIVE | Noted: 2018-12-05

## 2024-07-16 PROBLEM — D50.8 IRON DEFICIENCY ANEMIA SECONDARY TO INADEQUATE DIETARY IRON INTAKE: Status: ACTIVE | Noted: 2024-07-16

## 2024-07-16 RX ORDER — TIRZEPATIDE 2.5 MG/.5ML
2.5 INJECTION, SOLUTION SUBCUTANEOUS WEEKLY
Qty: 2 ML | Refills: 0 | Status: SHIPPED | OUTPATIENT
Start: 2024-07-16 | End: 2024-08-07

## 2024-07-16 RX ORDER — HYDROXYZINE HYDROCHLORIDE 25 MG/1
12.5 TABLET, FILM COATED ORAL 3 TIMES DAILY PRN
Qty: 30 TABLET | Refills: 0 | Status: SHIPPED | OUTPATIENT
Start: 2024-07-16

## 2024-07-16 RX ORDER — FLUOXETINE 10 MG/1
30 CAPSULE ORAL DAILY
Qty: 270 CAPSULE | Refills: 0 | Status: SHIPPED | OUTPATIENT
Start: 2024-07-16

## 2024-07-16 NOTE — TELEPHONE ENCOUNTER
PA for Zepbound approved  from 7/16/24 till 3/13/25    Approval faxed to pharmacy     And send to scan

## 2024-07-16 NOTE — TELEPHONE ENCOUNTER
Incoming (mail or fax):  fax  Received from:  [facility]  Documentation given to:  Triage incoming    PA - Zepbound

## 2024-07-16 NOTE — PROGRESS NOTES
CHIEF COMPLAINT:   Chief Complaint   Patient presents with    Routine Physical     Sees Gyne 8/1/23 Pap.       HPI , Assessment & Plan:   Shannan Lindsey is a 24 year old with pmhx of obesity, LEXI, and iron deficiency presenting for medication follow-up.     Wt Readings from Last 6 Encounters:   07/16/24 (!) 376 lb (170.6 kg)   06/06/24 (!) 374 lb (169.6 kg)   04/03/24 (!) 350 lb (158.8 kg)   02/26/24 (!) 358 lb 12.8 oz (162.8 kg)   11/16/23 (!) 349 lb 3.2 oz (158.4 kg)   10/24/23 (!) 354 lb (160.6 kg)     Body mass index is 60.69 kg/m².     //Iron deficiency anemia   //Menorrhagia   Reporting that the past couple of periods, getting very heavy periods. Seeing a lot of blood clots and sometimes really big. Multiple blood clots per day. Period is occurring every month, but sometimes it occurs earlier in the month. Uses two pads to help th coverage, but heavy needs 3 pads. Period is a bit more than a week 8-10 days. 2-3 days are really heavy. Usually the heavy days are the 3-4th day. Has been on BID dose of iron supplementation.   PLAN:   Likely etiology of SONIYA is poor absorption in the setting of reported gluten sensitivity and lactase deficiency vs blood loss in the setting of heavy periods. On BID iron supplementation without improvement in anemia and SONIYA. Celiac panel and hpylori negative.   -Ferrous sulfate 325mg BID.  -Venofer x 3 doses ordered. Waiting prior authorization approval.   -Pending gyn evaluation. If no improvement after gyn evaluation, then plan for GI evaluation and colonoscopy.     //IT band syndrome  vs. Lateral femoral cutaneous nerve entrapment, R  Patient reporting symptoms are much better with stretching. Did not go to PT.   PLAN:   Ddx includes lateral femoral cutaneous nerve entrapment vs. IT band syndrome. High suspicion for lateral femoral cutaneous nerve entrapment with report of numbness, back pain, lateral leg pain and recent weight gain. Recommend stretches in the mean time. Recommend  weight loss and loose fitted clothing to prevent compression.   -If pain starts again, patient needs to go to PT. Referral is already in place.     //Facial flushing and sweating, improved   Facial flushing and sweating is resolving now. Feels like hot flashes and noting that it happens when anxiety is starting to peak. Feels overall better. Full lab panel was done which was negative.   Hx: Started when she had mono last year. Occurs also in the winter. Difference in temperature can trigger it. Denies joint pain or swelling, weight loss, rashes, shortness of breath, chest pain.   PLAN:   Work-up negative for thyroid dysfunction, cushings, autoimmune, carcinoid syndrome. Vitamin B12, folic acid within normal limits. Less likely pheochromocytoma without blood pressure issues, rosacea without common skin changes on exam. If symptoms worsen or persist, would consider carcinoid work-up. Has been on fluoxetine which can cause such symptoms, but she is reporting this has been ongoing since prior to starting fluoxetine. She does note today that these symptoms are lining up with when she has really bad anxiety.   -As symptoms are improved overall and related to anxiety, will hold off on 5HIAA urine 24hr collection if all other labs negative.     //GERD, resolved  //Dyspepsia  Heart burn much better now. Now not getting it or is able to identify her triggers when it has.   PLAN:   Did not start omeprazole. Symptoms much better now with lifestlye changes.     //Thrombocytosis, improving  Denies symptoms of bleeding or clotting or rash.   PLAN:   Likely etiology is SONIYA. Improving as she has been repleted with iron. Did occur after mono symptoms. Abdominal ultrasound negative for splenomegaly. If not resolved after treatment of SONIYA, plan for hematology consultation.     //LEXI  Feels her anxiety is worse now. She is delaying the start of her next semester and is having some anxiety with life plans and trajectory. She has been on  fluoxetine 20mg daily and would like to go up on the dose. Denies having any panic attacks.. Not having any side effects. Feeling like subconscious anxiety is more apparent. Therapist is on maternity leave which does not help. Not having panic attacks, but feels like she is starting to.   PLAN:   Tolerating fluoxetine well. Will increase dose to 30mg daily. Provided with a PRN script of hydroxyzine in case she has panic attacks. She is waiting for her therapist to return to restart therapy.    -Increase fluoxetine to 30mg daily  -Restart therapy.     //Morbid obesity  Reports difficulty with diet control and exercise due to anxiety. Weight loss clinic appointment set up in September.    PLAN:   Feel that weight loss clinic will be appropriate for her and may really help her. She is amenable to starting zepbound for weight loss. She is not able to make the lifestyle changes with her anxiety level right now and resorts to binge eating at times to cope and is causing more weight gain. Discussed side effects of zepbound and she is amenable at this time. Denies family hx of thyroid cancer and personal history of pancreatitis.   -Weight loss clinic appointment scheduled.  -zepbound 2.5mg qweekly ordered.     HEALTH MAINTENANCE:   -Vaccinations: Flu Refused, COVID Due  -Colonoscopy: -  -Mammogram: - Not indicated at this time.   -Pap Smear: 08/01/2023 negative, due 2026.   -Diabetes screening: Last A1c value was  % done  . Fasting glucose normal 7/2024  -Lipid screening: Cholesterol: 145, done on 10/27/2023.  HDL Cholesterol: 38, done on 10/27/2023.  TriGlycerides 191, done on 10/27/2023.  LDL Cholesterol: 75, done on 10/27/2023.   -Birth Control: Condom  -Smoking: None  -Alcohol: socially  -Marijuana: None  -Recreational drug: None    Return for as scheduled in September. .    Carmen Montana MD   Internal Medicine     Current Outpatient Medications   Medication Sig Dispense Refill    FLUoxetine 10 MG Oral Cap Take 3  capsules (30 mg total) by mouth daily. 270 capsule 0    Tirzepatide-Weight Management (ZEPBOUND) 2.5 MG/0.5ML Subcutaneous Solution Auto-injector Inject 2.5 mg into the skin once a week for 4 doses. 2 mL 0    hydrOXYzine 25 MG Oral Tab Take 0.5 tablets (12.5 mg total) by mouth 3 (three) times daily as needed for Anxiety. 30 tablet 0    FERROUS SULFATE OR Take 325 mg by mouth in the morning and 325 mg before bedtime.        Past Medical History:    LEXI (generalized anxiety disorder)    Transaminitis    2/2 Mononucleosis infection, resolved      Past Surgical History:   Procedure Laterality Date    Tonsillectomy Bilateral       Family History   Problem Relation Age of Onset    Diabetes Mother     Thyroid disease Father     Heart Disease Maternal Grandmother     Breast Cancer Paternal Aunt       Social History:   Social History     Socioeconomic History    Marital status: OTHER   Tobacco Use    Smoking status: Never     Passive exposure: Never    Smokeless tobacco: Never   Vaping Use    Vaping status: Never Used   Substance and Sexual Activity    Alcohol use: Not Currently     Comment: socially    Drug use: Never    Sexual activity: Yes     Partners: Male     Birth control/protection: Condom   Other Topics Concern    Caffeine Concern No    Exercise Yes    Seat Belt No    Special Diet No    Stress Concern Yes    Weight Concern Yes     Service No    Blood Transfusions No   Social History Narrative    Works at [employer]    Studying psychology at [employer]      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 : in a relationship. Children: 0.   Exercise: minimal.  Diet: doesn't watch     REVIEW OF SYSTEMS:   Negative except for what is mentioned in HPI.     Screenings:   1. Little interest or pleasure in doing things: Several days  2. Feeling down, depressed, or hopeless: Several days  3. Trouble falling or staying asleep, or sleeping too much: Not at all  4. Feeling tired or having little energy: Nearly every  day  5. Poor appetite or overeating: Nearly every day  6. Feeling bad about yourself - or that you are a failure or have let yourself or your family down: Not at all  7. Trouble concentrating on things, such as reading the newspaper or watching television: Not at all  8. Moving or speaking so slowly that other people could have noticed. Or the opposite - being so fidgety or restless that you have been moving around a lot more than usual: Not at all  9. Thoughts that you would be better off dead, or of hurting yourself in some way: Not at all  PHQ-9 TOTAL SCORE: 8  If you checked off any problems, how difficult have these problems made it for you to do your work, take care of things at home, or get along with other people?: Not difficult at all     EXAM:   /70 (BP Location: Right arm, Patient Position: Sitting, Cuff Size: large)   Pulse 80   Temp 98.2 °F (36.8 °C)   Resp 20   Ht 5' 6\" (1.676 m)   Wt (!) 376 lb (170.6 kg)   LMP 07/12/2024 (Exact Date)   SpO2 98%   BMI 60.69 kg/m²   Body mass index is 60.69 kg/m².   GENERAL: well developed, well nourished,in no apparent distress  SKIN: no rashes,no suspicious lesions  HEENT: atraumatic, normocephalic,ears and throat are clear  EYES:PERRLA, conjunctiva are clear  NECK: supple,no adenopathy,no bruits  CHEST: no chest tenderness  LUNGS: clear to auscultation  CARDIO: nl s1 and s2, RRR without murmur  GI: good BS's,no masses, HSM or tenderness  MUSCULOSKELETAL: back is not tender,FROM of the back  EXTREMITIES: no cyanosis, clubbing or edema  NEURO: Oriented times three,cranial nerves are intact,motor and sensory are grossly intact    Labs:   Lab Results   Component Value Date/Time    WBC 10.9 07/11/2024 07:16 AM    HGB 12.2 07/11/2024 07:16 AM    .0 (H) 07/11/2024 07:16 AM      Lab Results   Component Value Date/Time    GLU 98 07/11/2024 07:16 AM     07/11/2024 07:16 AM    K 4.4 07/11/2024 07:16 AM     07/11/2024 07:16 AM    CO2 26.0  07/11/2024 07:16 AM    CREATSERUM 0.76 07/11/2024 07:16 AM    CA 9.0 07/11/2024 07:16 AM    ALB 3.5 07/11/2024 07:16 AM    TP 7.3 07/11/2024 07:16 AM    ALKPHO 86 07/11/2024 07:16 AM    AST 11 (L) 07/11/2024 07:16 AM    ALT 30 07/11/2024 07:16 AM    BILT 0.4 07/11/2024 07:16 AM    TSH 2.970 07/11/2024 07:16 AM        Lab Results   Component Value Date/Time    CHOLEST 145 10/27/2023 11:48 AM    HDL 38 (L) 10/27/2023 11:48 AM    TRIG 191 (H) 10/27/2023 11:48 AM    LDL 75 10/27/2023 11:48 AM    NONHDLC 107 10/27/2023 11:48 AM       No results found for: \"A1C\"     No results found for: \"VITD\"      Imaging:   No results found.

## 2024-07-16 NOTE — PATIENT INSTRUCTIONS
//Obesity - ordered zepbound. Prior auth will be placed by office.     //Anxiety - increase fluoxetine to 30mg daily. New script placed. Hydroxyzine as needed for panic attacks.     //Iron deficiency - continue twice a day iron supplementation. Have placed orders for iron infusion, awaiting insurance approval. Will notify you if covered. Please see gyn for heavy menstrual cycle.

## 2024-07-16 NOTE — TELEPHONE ENCOUNTER
Called insurance to check on pre-auth on venofer infusion and they said that pre-auth is not needed. Called [phone] talked to Erica OTT in specialty pre-cert dept. Form faxed to [facility]     Patient notified. Patient verbalized understanding

## 2024-09-04 ENCOUNTER — OFFICE VISIT (OUTPATIENT)
Facility: CLINIC | Age: 24
End: 2024-09-04
Payer: COMMERCIAL

## 2024-09-04 VITALS
WEIGHT: 293 LBS | BODY MASS INDEX: 47.09 KG/M2 | DIASTOLIC BLOOD PRESSURE: 76 MMHG | SYSTOLIC BLOOD PRESSURE: 118 MMHG | HEART RATE: 115 BPM | HEIGHT: 66 IN

## 2024-09-04 DIAGNOSIS — N92.1 MENOMETRORRHAGIA: Primary | ICD-10-CM

## 2024-09-04 PROCEDURE — 99213 OFFICE O/P EST LOW 20 MIN: CPT | Performed by: OBSTETRICS & GYNECOLOGY

## 2024-09-04 RX ORDER — NORETHINDRONE ACETATE AND ETHINYL ESTRADIOL 1MG-20(21)
1 KIT ORAL DAILY
Qty: 84 TABLET | Refills: 3 | Status: SHIPPED | OUTPATIENT
Start: 2024-09-04 | End: 2025-09-04

## 2024-09-04 NOTE — PROGRESS NOTES
Shannan Lindsey is a 24 year old female  Patient's last menstrual period was 2024 (exact date).   Chief Complaint   Patient presents with    Gyn Problem     Pt c/o heavy bleeding, referred by Pcp   .  Patient c/o heavy, painful menses lasting up to 2 weeks cait 3-4 weeks, diagnosed with anemia. Patient is working with [facility] , plans to move to [place] next year and start grad school next    OBSTETRICS HISTORY:  OB History    Para Term  AB Living   0 0 0 0 0 0   SAB IAB Ectopic Multiple Live Births   0 0 0 0 0       GYNE HISTORY:  Periods regular monthly    History   Sexual Activity    Sexual activity: Not Currently    Partners: Male    Birth control/ protection: Condom        Pap Date: 24  Pap Result Notes: negative        MEDICAL HISTORY:  Past Medical History:    LEXI (generalized anxiety disorder)    Transaminitis    2/2 Mononucleosis infection, resolved       SURGICAL HISTORY:  Past Surgical History:   Procedure Laterality Date    Tonsillectomy Bilateral        SOCIAL HISTORY:  Social History     Socioeconomic History    Marital status: OTHER     Spouse name: Not on file    Number of children: Not on file    Years of education: Not on file    Highest education level: Not on file   Occupational History    Not on file   Tobacco Use    Smoking status: Never     Passive exposure: Never    Smokeless tobacco: Never   Vaping Use    Vaping status: Never Used   Substance and Sexual Activity    Alcohol use: Not Currently     Comment: socially    Drug use: Never    Sexual activity: Not Currently     Partners: Male     Birth control/protection: Condom   Other Topics Concern    Caffeine Concern No    Exercise Yes    Seat Belt No    Special Diet No    Stress Concern Yes    Weight Concern Yes     Service No    Blood Transfusions No    Occupational Exposure Not Asked    Hobby Hazards Not Asked    Sleep Concern Not Asked    Back Care Not Asked    Bike Helmet Not Asked    Self-Exams  Not Asked   Social History Narrative    Works at [employer]    Studying psychology at [employer]      Social Determinants of Health     Financial Resource Strain: Not on file   Food Insecurity: Not on file   Transportation Needs: Not on file   Physical Activity: Not on file   Stress: Not on file   Social Connections: Not on file   Housing Stability: Not on file       FAMILY HISTORY:  Family History   Problem Relation Age of Onset    Diabetes Mother     Thyroid disease Father     Heart Disease Maternal Grandmother     Breast Cancer Paternal Aunt        MEDICATIONS:    Current Outpatient Medications:     Norethin Ace-Eth Estrad-FE (JUNEL FE 1/20) 1-20 MG-MCG Oral Tab, Take 1 tablet by mouth daily., Disp: 84 tablet, Rfl: 3    FLUoxetine 10 MG Oral Cap, Take 3 capsules (30 mg total) by mouth daily., Disp: 270 capsule, Rfl: 0    FERROUS SULFATE OR, Take 325 mg by mouth in the morning and 325 mg before bedtime., Disp: , Rfl:     hydrOXYzine 25 MG Oral Tab, Take 0.5 tablets (12.5 mg total) by mouth 3 (three) times daily as needed for Anxiety. (Patient not taking: Reported on 9/4/2024), Disp: 30 tablet, Rfl: 0    ALLERGIES:    Allergies   Allergen Reactions    Clavulanic Acid HIVES     Mom is unsure if it was the augmentin or the food (mushroons) she ate. Tested and not allergic to PCN    Amoxicillin-Pot Clavulanate OTHER (SEE COMMENTS)     Unknown           PHYSICAL EXAM:   Constitutional: well developed, well nourished  Head/Face: normocephalic  Skin/Hair: no unusual rashes or bruises  Extremities: no edema, no cyanosis  Psychiatric:  Oriented to time, place, person and situation. Appropriate mood and affect    7/11/24  TSH 2.97  Hb 12.2, ferritin 34.5    Taking iron supplement     Discussed weight loss to help with menses, also trial of OCP- instructions provided    Assessment & Plan:  Diagnoses and all orders for this visit:    Menometrorrhagia    Other orders  -     Norethin Ace-Eth Estrad-FE (JUNEL FE 1/20) 1-20  MG-MCG Oral Tab; Take 1 tablet by mouth daily.

## 2024-09-26 ENCOUNTER — OFFICE VISIT (OUTPATIENT)
Dept: INTERNAL MEDICINE CLINIC | Facility: CLINIC | Age: 24
End: 2024-09-26
Payer: COMMERCIAL

## 2024-09-26 DIAGNOSIS — E66.01 OBESITY, MORBID, BMI 50 OR HIGHER (HCC): ICD-10-CM

## 2024-09-26 DIAGNOSIS — K76.0 FATTY LIVER: Primary | ICD-10-CM

## 2024-09-26 PROCEDURE — 97802 MEDICAL NUTRITION INDIV IN: CPT | Performed by: DIETITIAN, REGISTERED

## 2024-09-26 NOTE — PROGRESS NOTES
INITIAL OUTPATIENT NUTRITION CONSULTATION    Nutrition Assessment    Medical Diagnosis: Obesity, iron deficiency, hyperlipidemia, fatty liver disease    Physical Findings: joint pain    Client Age and Gender: 24 year old female    Pertinent social hx:Single, moving to [place] in Feb to be near boyfriend.  Works at [email or web address]      Labs:   No components found for: \"HGBA1C\"  Triglycerides   Date Value Ref Range Status   10/27/2023 191 (H) 30 - 149 mg/dL Final     Comment:     Reference interval for fasting triglycerides  Desirable: <150 mg/dL  Borderline: 150-199 mg/dL  High: 200-499 mg/dL  Very High: >=500 mg/dL           LDL Cholesterol   Date Value Ref Range Status   10/27/2023 75 <100 mg/dL Final     Comment:     Desirable <100 mg/dL   Borderline 100-129 mg/dL   High     >=130mg/dL         HDL Cholesterol   Date Value Ref Range Status   10/27/2023 38 (L) 40 - 59 mg/dL Final     Comment:     Interpretive Information:   An HDL cholesterol <40 mg/dL is low and constitutes a coronary heart disease risk factor. An HDL cholesterol >60 mg/dL is a negative risk factor for coronary heart disease.         AST   Date Value Ref Range Status   07/11/2024 11 (L) 15 - 37 U/L Final     ALT   Date Value Ref Range Status   07/11/2024 30 13 - 56 U/L Final         Height:  Ht Readings from Last 1 Encounters:   09/24/24 5' 7\" (1.702 m)       Weight:   Wt Readings from Last 2 Encounters:   09/24/24 (!) 377 lb (171 kg)   09/04/24 (!) 381 lb 12.8 oz (173.2 kg)       BMI Readings from Last 1 Encounters:   09/24/24 59.05 kg/m²       Weight change: Increase of 23 lbs in the past year    Diet/Weight History: Max weight of  381 lbs in September.  Overweight since childhood    Current Diet: High in fat,sugar and starch from pizza, ice cream, soda, candy and convenience snacks.  Diet lacks fruits and vegetables.  Pt reports emotional eating.  Craves pizza when having negative emotions. Reports gluten and dairy causing GI  distress,    Food/Beverage Intake: oral recall  Breakfast: Eggs, turkey sausage, waffle with butter and syrup, 4 oz apple juice  Lunch: [employer] Italian sub sandwich, chips, cookie, Sprite  Dinner: [employer] slice of pizza  Snacks: Chocolate chip cookie, candy  Beverages: Water, soda, juice, sweetened coffee from [employer] with cream    Meal pattern: 2-3 meals/d, frequent snacks/d    Number of meals/week eaten at restaurants: 4-5        Alcohol Intake: drinks/wk    Estimated current caloric intake: 3370 cals/d    Estimated caloric needs for weight loss: 2370 cals/d for 2 pounds/week weight loss    Physical Activity: 0 hrs/week of structured activity.  Active at work lifting and walking    Food Journal: no    Spent 60 minutes in consultation with the patient.      Nutrition Intervention/Education:  Comprehensive nutrition education and evaluation provided for weight loss. Pt familiar to author from work pt's mother and participation in childhood obesity program.  Pt struggles with emotional eating.  Reports feeling that she needs certain foods such as ice cream to function.  Discussed strategies for responding to unhealthy thoughts around food.  Discussed options for reducing intake of high calorie foods including reduced portions, or reduced frequency, or swapping for lower calorie option.  Pt struggles with black and white thinking about food and reports guilt when eating unhealthy foods.  Aiming for better habits vs. Perfect was emphasized.  Discussed goals to allow favorite foods and also support weight loss. Patient agreed to goals below.    Goals:   Switch from deep dish pizza to regular or thin crust  Swap sweetened coffee for sugar free and switch to whole milk  Switch from regular soda to diet    Monitoring/Evaluation:  Follow up appt scheduled with dietitian  10/22        Ham Braden MS, RD, LDN

## 2024-10-17 PROBLEM — F41.1 GENERALIZED ANXIETY DISORDER: Status: ACTIVE | Noted: 2023-11-16

## 2024-10-17 PROBLEM — F50.810 MILD BINGE-EATING DISORDER: Status: ACTIVE | Noted: 2024-10-17

## 2024-10-22 ENCOUNTER — OFFICE VISIT (OUTPATIENT)
Dept: INTERNAL MEDICINE CLINIC | Facility: CLINIC | Age: 24
End: 2024-10-22
Payer: COMMERCIAL

## 2024-10-22 VITALS — WEIGHT: 293 LBS | BODY MASS INDEX: 61 KG/M2

## 2024-10-22 DIAGNOSIS — E66.01 OBESITY, MORBID, BMI 50 OR HIGHER (HCC): ICD-10-CM

## 2024-10-22 PROCEDURE — 97803 MED NUTRITION INDIV SUBSEQ: CPT | Performed by: DIETITIAN, REGISTERED

## 2024-10-22 RX ORDER — FLUOXETINE 10 MG/1
30 CAPSULE ORAL DAILY
Qty: 270 CAPSULE | Refills: 0 | Status: SHIPPED | OUTPATIENT
Start: 2024-10-22

## 2024-10-22 NOTE — TELEPHONE ENCOUNTER
Last OV relevant to medication: 7/16/24  Last refill date:7/16/24 #90/refills: 0  When pt was asked to return for OV: september  Upcoming appt/reason:   Future Appointments   Date Time Provider Department Center   10/22/2024  1:00 PM Ham Braden RD [facility] [address]   10/30/2024  1:30 PM Yu Edge PsyD [facility]   12/4/2024 12:00 PM Sana Washington MD [facility] [address]   12/12/2024  9:00 AM Kira Hassan DO [facility] [place]   Was pt informed of any over due labs: utd    Please call pt to reschedule med check, thanks!

## 2024-10-22 NOTE — PROGRESS NOTES
FOLLOW UP NUTRITION CONSULTATION  Nutrition Assessment    Nutrition related diagnoses:Obesity, iron deficiency, hyperlipidemia, fatty liver disease    Number of consultations with dietitian: 2    Height:  Ht Readings from Last 1 Encounters:   09/24/24 5' 7\" (1.702 m)       Weight:   Wt Readings from Last 2 Encounters:   10/22/24 (!) 387 lb (175.5 kg)   09/24/24 (!) 377 lb (171 kg)       BMI:  BMI Readings from Last 1 Encounters:   10/22/24 60.61 kg/m²       Weight change: Increase of 10 lbs  in the past month    Current weight loss medication: None      Current Diet/Changes in Eating Habits: Met goals at last appt of changing coffee to whole milk with 1 pump sweetener, opting for thin crust pizza, discontinued soda or drinks diet rather than regular      Diet/Lifestyle Modifications Following Previous Nutrition Consultation      Food journal: no    Diet recall:     Breakfast: Patiño, egg, and cheese bagel with hash brown tots from [employer] with coffee with milk and lightly sweetened  Lunch: 9 am at work.  Dumas and chips  Second lunch after work:  May  fast food on her way home  Dinner:Did not discuss  Snacks:frequent,sweets in the afternoon  Beverages: water     Physical activity: no structured activity currently.  Active working in grocery store    Spent 30 minutes in consultation with the patient.      Nutrition Assessment and Intervention/Education:    Nutrition follow up for weight loss was provided. Pt met goals at last appt.  Had several insights into eating habits.  Was eating 2 lunch meals due to early lunch break at work.  Discussed goal of having a snack rather than a meal at 9 am and listed options. Pt feeling very tired after work but also has difficulty falling asleep at night.  Craves sugar in the afternoon, likely related to fatigue.  Set goals to help improve sleep schedule and fatigue.  Packing a snack to eat on the way home from work to avoid picking up fast food was agreed upon. Patient  agreed to goals below.    Goals:  Have a snack at 9 am as discussed  Avoid caffeine in the afternoon/take a short nap  Pack a snack to eat on the way home from work    Monitoring/Evaluation: Follow up appt scheduled with dietitian 11/20        Ham Braden MS, RD, LDN

## 2024-12-03 ENCOUNTER — OFFICE VISIT (OUTPATIENT)
Dept: INTERNAL MEDICINE CLINIC | Facility: CLINIC | Age: 24
End: 2024-12-03
Payer: COMMERCIAL

## 2024-12-03 VITALS
HEART RATE: 100 BPM | TEMPERATURE: 98 F | OXYGEN SATURATION: 99 % | HEIGHT: 67 IN | DIASTOLIC BLOOD PRESSURE: 70 MMHG | RESPIRATION RATE: 20 BRPM | SYSTOLIC BLOOD PRESSURE: 130 MMHG | WEIGHT: 293 LBS | BODY MASS INDEX: 45.99 KG/M2

## 2024-12-03 DIAGNOSIS — J06.9 UPPER RESPIRATORY INFECTION, VIRAL: Primary | ICD-10-CM

## 2024-12-03 DIAGNOSIS — R00.0 TACHYCARDIA: ICD-10-CM

## 2024-12-03 PROCEDURE — 99213 OFFICE O/P EST LOW 20 MIN: CPT | Performed by: STUDENT IN AN ORGANIZED HEALTH CARE EDUCATION/TRAINING PROGRAM

## 2024-12-03 NOTE — PATIENT INSTRUCTIONS
Tylenol 1000mg every 6 hours as needed for pain/fever  Ibuprofen 400-600mg twice a day as needed for pain. Take with food.   Pseudophed and Mucinex 1-2xs/day to loosen up mucus   Nyquil/Dayquil to help relieve cough   Flonase 1 puff in each nostril twice a day to help with congestion and/or ear fluid  Afrin x 3 days (follow instruction on packaging) to help with congestion and/or ear fluid  Honey tea and rest  Throat lozenges if sore throat

## 2024-12-03 NOTE — PROGRESS NOTES
CHIEF COMPLAINT:   Chief Complaint   Patient presents with    Viral Syndrome     Sore throat + neck feels swollen  , cough, sinus pressure, post nasal drip symptoms Sat night lingering to Sunday morning  no Covid test no fever       HPI , Assessment & Plan:   Shannan Lindsey is a 24 year old with pmhx of obesity, LEXI, and iron deficiency presenting for medication follow-up.     Wt Readings from Last 6 Encounters:   12/03/24 (!) 380 lb (172.4 kg)   10/22/24 (!) 387 lb (175.5 kg)   09/24/24 (!) 377 lb (171 kg)   09/04/24 (!) 381 lb 12.8 oz (173.2 kg)   07/16/24 (!) 376 lb (170.6 kg)   06/06/24 (!) 374 lb (169.6 kg)     Body mass index is 59.52 kg/m².     //Upper respiratory viral infection  //Tachycardia  Cough, runny nose, sinus pressure, sore throat, congestion all started Sunday. Feels short of breath with exertion. Denies fever, chest pain, productive cough, yellow discharge. Feels congested related shortness of breath. Sleeping ok at night right now. Feels neck is really swollen too. Felt tender yesterday. Denies palpitations, dizziness, and lightheadedness.   PLAN:   COVID test negative. Symptoms likely secondary to dehydration in the setting of viral illness. Pulse ox remains above 90 with ambulation. Ear exam notable for TM bulging and redness, but no evidence of active infection or pain on examination. Likely viral and secondary to congestion. Recommend afrin, flonase, and nasal saline as discussed. Can take tylenol and ibuprofen as needed. If having dizziness, lightheadedness, palpitations, would recommend reevaluation with EKG to ensure no evidence of arrhythmia in the setting of illness.   -If not better within 72 hours, developing more sinus pain, fevers, ear pain despite flonase and afrin, then she will need antibiotics.     PROBLEMS NOT DISCUSSED TODAY:   //Iron deficiency anemia   //Menorrhagia   Reporting that the past couple of periods, getting very heavy periods. Seeing a lot of blood clots and  sometimes really big. Multiple blood clots per day. Period is occurring every month, but sometimes it occurs earlier in the month. Uses two pads to help th coverage, but heavy needs 3 pads. Period is a bit more than a week 8-10 days. 2-3 days are really heavy. Usually the heavy days are the 3-4th day. Has been on BID dose of iron supplementation.   PLAN:   Likely etiology of SONIYA is poor absorption in the setting of reported gluten sensitivity and lactase deficiency vs blood loss in the setting of heavy periods. On BID iron supplementation without improvement in anemia and SONIYA. Celiac panel and hpylori negative.   -Ferrous sulfate 325mg BID.  -Venofer x 3 doses ordered. Waiting prior authorization approval.   -Pending gyn evaluation. If no improvement after gyn evaluation, then plan for GI evaluation and colonoscopy.     //IT band syndrome  vs. Lateral femoral cutaneous nerve entrapment, R  Patient reporting symptoms are much better with stretching. Did not go to PT.   PLAN:   Ddx includes lateral femoral cutaneous nerve entrapment vs. IT band syndrome. High suspicion for lateral femoral cutaneous nerve entrapment with report of numbness, back pain, lateral leg pain and recent weight gain. Recommend stretches in the mean time. Recommend weight loss and loose fitted clothing to prevent compression.   -If pain starts again, patient needs to go to PT. Referral is already in place.     //Facial flushing and sweating, improved   Facial flushing and sweating is resolving now. Feels like hot flashes and noting that it happens when anxiety is starting to peak. Feels overall better. Full lab panel was done which was negative.   Hx: Started when she had mono last year. Occurs also in the winter. Difference in temperature can trigger it. Denies joint pain or swelling, weight loss, rashes, shortness of breath, chest pain.   PLAN:   Work-up negative for thyroid dysfunction, cushings, autoimmune, carcinoid syndrome. Vitamin B12,  folic acid within normal limits. Less likely pheochromocytoma without blood pressure issues, rosacea without common skin changes on exam. If symptoms worsen or persist, would consider carcinoid work-up. Has been on fluoxetine which can cause such symptoms, but she is reporting this has been ongoing since prior to starting fluoxetine. She does note today that these symptoms are lining up with when she has really bad anxiety.   -As symptoms are improved overall and related to anxiety, will hold off on 5HIAA urine 24hr collection if all other labs negative.     //GERD, resolved  //Dyspepsia  Heart burn much better now. Now not getting it or is able to identify her triggers when it has.   PLAN:   Did not start omeprazole. Symptoms much better now with lifestlye changes.     //Thrombocytosis, improving  Denies symptoms of bleeding or clotting or rash.   PLAN:   Likely etiology is SONIYA. Improving as she has been repleted with iron. Did occur after mono symptoms. Abdominal ultrasound negative for splenomegaly. If not resolved after treatment of SONIYA, plan for hematology consultation.     //LEXI  Feels her anxiety is worse now. She is delaying the start of her next semester and is having some anxiety with life plans and trajectory. She has been on fluoxetine 20mg daily and would like to go up on the dose. Denies having any panic attacks.. Not having any side effects. Feeling like subconscious anxiety is more apparent. Therapist is on maternity leave which does not help. Not having panic attacks, but feels like she is starting to.   PLAN:   Tolerating fluoxetine well. Will increase dose to 30mg daily. Provided with a PRN script of hydroxyzine in case she has panic attacks. She is waiting for her therapist to return to restart therapy.    -Increase fluoxetine to 30mg daily  -Restart therapy.     //Morbid obesity  Reports difficulty with diet control and exercise due to anxiety. Weight loss clinic appointment set up in September.     PLAN:   Feel that weight loss clinic will be appropriate for her and may really help her. She is amenable to starting zepbound for weight loss. She is not able to make the lifestyle changes with her anxiety level right now and resorts to binge eating at times to cope and is causing more weight gain. Discussed side effects of zepbound and she is amenable at this time. Denies family hx of thyroid cancer and personal history of pancreatitis.   -Weight loss clinic appointment scheduled.  -zepbound 2.5mg qweekly ordered.     HEALTH MAINTENANCE:   -Vaccinations: Flu Refused, COVID Due  -Colonoscopy: -  -Mammogram: - Not indicated at this time.   -Pap Smear: 08/01/2023 negative, due 2026.   -Diabetes screening: Last A1c value was  % done  . Fasting glucose normal 7/2024  -Lipid screening: Cholesterol: 145, done on 10/27/2023.  HDL Cholesterol: 38, done on 10/27/2023.  TriGlycerides 191, done on 10/27/2023.  LDL Cholesterol: 75, done on 10/27/2023.   -Birth Control: Condom  -Smoking: None  -Alcohol: socially  -Marijuana: None  -Recreational drug: None    Return if symptoms worsen or fail to improve.    Carmen Montana MD   Internal Medicine     Current Outpatient Medications   Medication Sig Dispense Refill    FLUOXETINE 10 MG Oral Cap TAKE 3 CAPSULES BY MOUTH DAILY. 270 capsule 0    Norethin Ace-Eth Estrad-FE (JUNEL FE 1/20) 1-20 MG-MCG Oral Tab Take 1 tablet by mouth daily. 84 tablet 3    hydrOXYzine 25 MG Oral Tab Take 0.5 tablets (12.5 mg total) by mouth 3 (three) times daily as needed for Anxiety. 30 tablet 0    FERROUS SULFATE OR Take 325 mg by mouth in the morning and 325 mg before bedtime.      Tirzepatide-Weight Management (ZEPBOUND) 2.5 MG/0.5ML Subcutaneous Solution Auto-injector Inject 2.5 mg into the skin once a week. (Patient not taking: Reported on 12/3/2024) 2 mL 1    Tirzepatide-Weight Management (ZEPBOUND) 5 MG/0.5ML Subcutaneous Solution Auto-injector Inject 5 mg into the skin once a week. (Patient not  taking: Reported on 12/3/2024) 2 mL 0      Past Medical History:    LEXI (generalized anxiety disorder)    Transaminitis    2/2 Mononucleosis infection, resolved      Past Surgical History:   Procedure Laterality Date    Tonsillectomy Bilateral       Family History   Problem Relation Age of Onset    Diabetes Mother     Thyroid disease Father     Heart Disease Maternal Grandmother     Breast Cancer Paternal Aunt       Social History:   Social History     Socioeconomic History    Marital status: OTHER   Tobacco Use    Smoking status: Never     Passive exposure: Never    Smokeless tobacco: Never   Vaping Use    Vaping status: Never Used   Substance and Sexual Activity    Alcohol use: Not Currently     Comment: socially    Drug use: Never    Sexual activity: Not Currently     Partners: Male     Birth control/protection: Condom   Other Topics Concern    Caffeine Concern No    Exercise Yes    Seat Belt No    Special Diet No    Stress Concern Yes    Weight Concern Yes     Service No    Blood Transfusions No   Social History Narrative    Works at [employer]    Studying psychology at [employer]      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 : in a relationship. Children: 0.   Exercise: minimal.  Diet: doesn't watch     REVIEW OF SYSTEMS:   Negative except for what is mentioned in HPI.     EXAM:   /70 (BP Location: Right arm, Patient Position: Sitting, Cuff Size: large)   Pulse 100   Temp 97.8 °F (36.6 °C) (Temporal)   Resp 20   Ht 5' 7\" (1.702 m)   Wt (!) 380 lb (172.4 kg)   LMP 11/23/2024 (Exact Date)   SpO2 99%   BMI 59.52 kg/m²   Body mass index is 59.52 kg/m².   GENERAL: well developed, well nourished,in no apparent distress  SKIN: no rashes,no suspicious lesions  HEENT: atraumatic, normocephalic, throat with erythema, but no exudates. No tenderness to palpation of the maxillary and frontal sinuses.   EYES:PERRLA, conjunctiva are clear  NECK: supple,no adenopathy,no bruits. Tenderness noted  to palpation behind the year and lateral neck.   CHEST: no chest tenderness  LUNGS: clear to auscultation  CARDIO: nl s1 and s2, RRR without murmur  NEURO: Oriented times three,cranial nerves are intact,motor and sensory are grossly intact    Labs:   Lab Results   Component Value Date/Time    WBC 10.9 07/11/2024 07:16 AM    HGB 12.2 07/11/2024 07:16 AM    .0 (H) 07/11/2024 07:16 AM      Lab Results   Component Value Date/Time    GLU 98 07/11/2024 07:16 AM     07/11/2024 07:16 AM    K 4.4 07/11/2024 07:16 AM     07/11/2024 07:16 AM    CO2 26.0 07/11/2024 07:16 AM    CREATSERUM 0.76 07/11/2024 07:16 AM    CA 9.0 07/11/2024 07:16 AM    ALB 3.5 07/11/2024 07:16 AM    TP 7.3 07/11/2024 07:16 AM    ALKPHO 86 07/11/2024 07:16 AM    AST 11 (L) 07/11/2024 07:16 AM    ALT 30 07/11/2024 07:16 AM    BILT 0.4 07/11/2024 07:16 AM    TSH 2.970 07/11/2024 07:16 AM        Lab Results   Component Value Date/Time    CHOLEST 145 10/27/2023 11:48 AM    HDL 38 (L) 10/27/2023 11:48 AM    TRIG 191 (H) 10/27/2023 11:48 AM    LDL 75 10/27/2023 11:48 AM    NONHDLC 107 10/27/2023 11:48 AM       No results found for: \"A1C\"     No results found for: \"VITD\"      Imaging:   No results found.

## 2024-12-09 ENCOUNTER — OFFICE VISIT (OUTPATIENT)
Dept: INTERNAL MEDICINE CLINIC | Facility: CLINIC | Age: 24
End: 2024-12-09
Payer: COMMERCIAL

## 2024-12-09 VITALS
DIASTOLIC BLOOD PRESSURE: 70 MMHG | RESPIRATION RATE: 20 BRPM | TEMPERATURE: 99 F | WEIGHT: 293 LBS | HEIGHT: 67 IN | HEART RATE: 98 BPM | BODY MASS INDEX: 45.99 KG/M2 | SYSTOLIC BLOOD PRESSURE: 128 MMHG | OXYGEN SATURATION: 99 %

## 2024-12-09 DIAGNOSIS — B96.89 ACUTE BACTERIAL SINUSITIS: Primary | ICD-10-CM

## 2024-12-09 DIAGNOSIS — J01.90 ACUTE BACTERIAL SINUSITIS: Primary | ICD-10-CM

## 2024-12-09 PROCEDURE — 99213 OFFICE O/P EST LOW 20 MIN: CPT | Performed by: STUDENT IN AN ORGANIZED HEALTH CARE EDUCATION/TRAINING PROGRAM

## 2024-12-09 RX ORDER — AMOXICILLIN 500 MG/1
500 CAPSULE ORAL 3 TIMES DAILY
Qty: 21 CAPSULE | Refills: 0 | Status: SHIPPED | OUTPATIENT
Start: 2024-12-09 | End: 2024-12-16

## 2024-12-09 NOTE — PROGRESS NOTES
CHIEF COMPLAINT:   Chief Complaint   Patient presents with    Sinus Problem     Was seen x 1 week for Viral Syndrome and  sinus pressure feels like symptoms has worsen now getting pain in both ears more pressure to the face , post nasal drip with green yellowish mucus .       HPI , Assessment & Plan:   Shannan Lindsey is a 24 year old with pmhx of obesity, LEXI, and iron deficiency presenting for sinus pain.     Wt Readings from Last 6 Encounters:   12/09/24 (!) 380 lb (172.4 kg)   12/03/24 (!) 380 lb (172.4 kg)   10/22/24 (!) 387 lb (175.5 kg)   09/24/24 (!) 377 lb (171 kg)   09/04/24 (!) 381 lb 12.8 oz (173.2 kg)   07/16/24 (!) 376 lb (170.6 kg)     Body mass index is 59.52 kg/m².     //Acute bacterial sinusitis  >1 week of symptoms now and has been progressive. Feels that sinus pressure has acutely worsened with pain in both ears, but L>R. Noting post nasal drip and mucus discharge has been green-yellowish in color. Denies palpitations, dizziness, and lightheadedness. She has been regular with using flonase and nasal saline washes, but symptoms have now progressed. Denies fevers.   PLAN:   Likely etiology is bacterial sinusitis. No evidence of otitis media on examination.   -Start Augmentin BID x 7 days.     PROBLEMS NOT DISCUSSED TODAY:   //Iron deficiency anemia   //Menorrhagia   Reporting that the past couple of periods, getting very heavy periods. Seeing a lot of blood clots and sometimes really big. Multiple blood clots per day. Period is occurring every month, but sometimes it occurs earlier in the month. Uses two pads to help th coverage, but heavy needs 3 pads. Period is a bit more than a week 8-10 days. 2-3 days are really heavy. Usually the heavy days are the 3-4th day. Has been on BID dose of iron supplementation.   PLAN:   Likely etiology of SONIYA is poor absorption in the setting of reported gluten sensitivity and lactase deficiency vs blood loss in the setting of heavy periods. On BID iron  supplementation without improvement in anemia and SONIYA. Celiac panel and hpylori negative.   -Ferrous sulfate 325mg BID.  -Venofer x 3 doses ordered. Waiting prior authorization approval.   -Pending gyn evaluation. If no improvement after gyn evaluation, then plan for GI evaluation and colonoscopy.     //IT band syndrome  vs. Lateral femoral cutaneous nerve entrapment, R  Patient reporting symptoms are much better with stretching. Did not go to PT.   PLAN:   Ddx includes lateral femoral cutaneous nerve entrapment vs. IT band syndrome. High suspicion for lateral femoral cutaneous nerve entrapment with report of numbness, back pain, lateral leg pain and recent weight gain. Recommend stretches in the mean time. Recommend weight loss and loose fitted clothing to prevent compression.   -If pain starts again, patient needs to go to PT. Referral is already in place.     //Facial flushing and sweating, improved   Facial flushing and sweating is resolving now. Feels like hot flashes and noting that it happens when anxiety is starting to peak. Feels overall better. Full lab panel was done which was negative.   Hx: Started when she had mono last year. Occurs also in the winter. Difference in temperature can trigger it. Denies joint pain or swelling, weight loss, rashes, shortness of breath, chest pain.   PLAN:   Work-up negative for thyroid dysfunction, cushings, autoimmune, carcinoid syndrome. Vitamin B12, folic acid within normal limits. Less likely pheochromocytoma without blood pressure issues, rosacea without common skin changes on exam. If symptoms worsen or persist, would consider carcinoid work-up. Has been on fluoxetine which can cause such symptoms, but she is reporting this has been ongoing since prior to starting fluoxetine. She does note today that these symptoms are lining up with when she has really bad anxiety.   -As symptoms are improved overall and related to anxiety, will hold off on 5HIAA urine 24hr  collection if all other labs negative.     //GERD, resolved  //Dyspepsia  Heart burn much better now. Now not getting it or is able to identify her triggers when it has.   PLAN:   Did not start omeprazole. Symptoms much better now with lifestlye changes.     //Thrombocytosis, improving  Denies symptoms of bleeding or clotting or rash.   PLAN:   Likely etiology is SONIYA. Improving as she has been repleted with iron. Did occur after mono symptoms. Abdominal ultrasound negative for splenomegaly. If not resolved after treatment of SONIYA, plan for hematology consultation.     //LEXI  Feels her anxiety is worse now. She is delaying the start of her next semester and is having some anxiety with life plans and trajectory. She has been on fluoxetine 20mg daily and would like to go up on the dose. Denies having any panic attacks.. Not having any side effects. Feeling like subconscious anxiety is more apparent. Therapist is on maternity leave which does not help. Not having panic attacks, but feels like she is starting to.   PLAN:   Tolerating fluoxetine well. Will increase dose to 30mg daily. Provided with a PRN script of hydroxyzine in case she has panic attacks. She is waiting for her therapist to return to restart therapy.    -Increase fluoxetine to 30mg daily  -Restart therapy.     //Morbid obesity  Reports difficulty with diet control and exercise due to anxiety. Weight loss clinic appointment set up in September.    PLAN:   Feel that weight loss clinic will be appropriate for her and may really help her. She is amenable to starting zepbound for weight loss. She is not able to make the lifestyle changes with her anxiety level right now and resorts to binge eating at times to cope and is causing more weight gain. Discussed side effects of zepbound and she is amenable at this time. Denies family hx of thyroid cancer and personal history of pancreatitis.   -Weight loss clinic appointment scheduled.  -zepbound 2.5mg qweekly  ordered.     HEALTH MAINTENANCE:   -Vaccinations: Flu Refused, COVID Due  -Colonoscopy: -  -Mammogram: - Not indicated at this time.   -Pap Smear: 08/01/2023 negative, due 2026.   -Diabetes screening: Last A1c value was  % done  . Fasting glucose normal 7/2024  -Lipid screening: Cholesterol: 145, done on 10/27/2023.  HDL Cholesterol: 38, done on 10/27/2023.  TriGlycerides 191, done on 10/27/2023.  LDL Cholesterol: 75, done on 10/27/2023.   -Birth Control: Condom  -Smoking: None  -Alcohol: socially  -Marijuana: None  -Recreational drug: None    No follow-ups on file.    Carmen Montana MD   Internal Medicine     Current Outpatient Medications   Medication Sig Dispense Refill    amoxicillin 500 MG Oral Cap Take 1 capsule (500 mg total) by mouth 3 (three) times daily for 7 days. 21 capsule 0    FLUOXETINE 10 MG Oral Cap TAKE 3 CAPSULES BY MOUTH DAILY. 270 capsule 0    Tirzepatide-Weight Management (ZEPBOUND) 2.5 MG/0.5ML Subcutaneous Solution Auto-injector Inject 2.5 mg into the skin once a week. 2 mL 1    Tirzepatide-Weight Management (ZEPBOUND) 5 MG/0.5ML Subcutaneous Solution Auto-injector Inject 5 mg into the skin once a week. 2 mL 0    Norethin Ace-Eth Estrad-FE (JUNEL FE 1/20) 1-20 MG-MCG Oral Tab Take 1 tablet by mouth daily. 84 tablet 3    hydrOXYzine 25 MG Oral Tab Take 0.5 tablets (12.5 mg total) by mouth 3 (three) times daily as needed for Anxiety. 30 tablet 0    FERROUS SULFATE OR Take 325 mg by mouth in the morning and 325 mg before bedtime.        Past Medical History:    LEXI (generalized anxiety disorder)    Transaminitis    2/2 Mononucleosis infection, resolved      Past Surgical History:   Procedure Laterality Date    Tonsillectomy Bilateral       Family History   Problem Relation Age of Onset    Diabetes Mother     Thyroid disease Father     Heart Disease Maternal Grandmother     Breast Cancer Paternal Aunt       Social History:   Social History     Socioeconomic History    Marital status: OTHER    Tobacco Use    Smoking status: Never     Passive exposure: Never    Smokeless tobacco: Never   Vaping Use    Vaping status: Never Used   Substance and Sexual Activity    Alcohol use: Not Currently     Comment: socially    Drug use: Never    Sexual activity: Not Currently     Partners: Male     Birth control/protection: Condom   Other Topics Concern    Caffeine Concern No    Exercise Yes    Seat Belt No    Special Diet No    Stress Concern Yes    Weight Concern Yes     Service No    Blood Transfusions No   Social History Narrative    Works at [employer]    Studying psychology at [employer]      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 : in a relationship. Children: 0.   Exercise: minimal.  Diet: doesn't watch     REVIEW OF SYSTEMS:   Negative except for what is mentioned in HPI.     EXAM:   /70 (BP Location: Right arm, Patient Position: Sitting, Cuff Size: large)   Pulse 98   Temp 98.7 °F (37.1 °C) (Temporal)   Resp 20   Ht 5' 7\" (1.702 m)   Wt (!) 380 lb (172.4 kg)   LMP 11/23/2024 (Exact Date)   SpO2 99%   BMI 59.52 kg/m²   Body mass index is 59.52 kg/m².   GENERAL: well developed, well nourished,in no apparent distress  HEENT: atraumatic, normocephalic, throat with erythema, but no exudates. Tenderness noted with frontal sinus palpation. Maxillary sinus palpation did not elicit pain. Green discharge noted in the nostrils bilaterally. Erythematous nasal turbinated. TM bulging bilaterally. No purulent discharge noted. Slight redness at the base of the TM noted bilaterally.   EYES:PERRLA, conjunctiva are clear  NECK: supple,no adenopathy.     Labs:   Lab Results   Component Value Date/Time    WBC 10.9 07/11/2024 07:16 AM    HGB 12.2 07/11/2024 07:16 AM    .0 (H) 07/11/2024 07:16 AM      Lab Results   Component Value Date/Time    GLU 98 07/11/2024 07:16 AM     07/11/2024 07:16 AM    K 4.4 07/11/2024 07:16 AM     07/11/2024 07:16 AM    CO2 26.0 07/11/2024 07:16 AM     CREATSERUM 0.76 07/11/2024 07:16 AM    CA 9.0 07/11/2024 07:16 AM    ALB 3.5 07/11/2024 07:16 AM    TP 7.3 07/11/2024 07:16 AM    ALKPHO 86 07/11/2024 07:16 AM    AST 11 (L) 07/11/2024 07:16 AM    ALT 30 07/11/2024 07:16 AM    BILT 0.4 07/11/2024 07:16 AM    TSH 2.970 07/11/2024 07:16 AM        Lab Results   Component Value Date/Time    CHOLEST 145 10/27/2023 11:48 AM    HDL 38 (L) 10/27/2023 11:48 AM    TRIG 191 (H) 10/27/2023 11:48 AM    LDL 75 10/27/2023 11:48 AM    NONHDLC 107 10/27/2023 11:48 AM       No results found for: \"A1C\"     No results found for: \"VITD\"      Imaging:   No results found.

## 2025-01-05 ENCOUNTER — HOSPITAL ENCOUNTER (OUTPATIENT)
Age: 25
Discharge: HOME | End: 2025-01-05
Payer: COMMERCIAL

## 2025-01-05 ENCOUNTER — APPOINTMENT (OUTPATIENT)
Dept: GENERAL RADIOLOGY | Age: 25
End: 2025-01-05
Attending: PHYSICIAN ASSISTANT
Payer: COMMERCIAL

## 2025-01-05 VITALS
RESPIRATION RATE: 20 BRPM | BODY MASS INDEX: 43.4 KG/M2 | TEMPERATURE: 99 F | DIASTOLIC BLOOD PRESSURE: 100 MMHG | WEIGHT: 293 LBS | HEIGHT: 69 IN | SYSTOLIC BLOOD PRESSURE: 128 MMHG | OXYGEN SATURATION: 94 % | HEART RATE: 90 BPM

## 2025-01-05 DIAGNOSIS — S63.501A SPRAIN OF RIGHT WRIST, INITIAL ENCOUNTER: Primary | ICD-10-CM

## 2025-01-05 PROCEDURE — 73110 X-RAY EXAM OF WRIST: CPT | Performed by: PHYSICIAN ASSISTANT

## 2025-01-05 PROCEDURE — 99213 OFFICE O/P EST LOW 20 MIN: CPT

## 2025-01-05 PROCEDURE — 99214 OFFICE O/P EST MOD 30 MIN: CPT

## 2025-01-05 RX ORDER — IBUPROFEN 600 MG/1
600 TABLET, FILM COATED ORAL EVERY 8 HOURS PRN
Qty: 30 TABLET | Refills: 0 | Status: SHIPPED | OUTPATIENT
Start: 2025-01-05 | End: 2025-01-15

## 2025-01-05 NOTE — ED INITIAL ASSESSMENT (HPI)
Patient reports she has had wrist pain for about 2 months.  Patient reports she recently jammed her hand and now has pain.

## 2025-01-05 NOTE — ED PROVIDER NOTES
Patient Seen in: [facility]      History     Chief Complaint   Patient presents with    Wrist Pain     Stated Complaint: Arm or Hand Injury - Wrist    Subjective:   HPI  Shannan Lindsey is 24 year old right hand dominant female presents with acute right wrist pain for over 2 months that is atraumatic in nature.   Patient reports pain is  2-5/10,  localized to dorsum of right wrist ,  non radiating, and is worsened with active/ passive range of motion/ palpation.  No numbness, tingling, parasthesias, skin/ color/ temperature/ sensory changes reported.  No medications taken prior to arrival.        Objective:     Past Medical History:    LEXI (generalized anxiety disorder)    Transaminitis    2/2 Mononucleosis infection, resolved              Past Surgical History:   Procedure Laterality Date    Tonsillectomy Bilateral                 Social History     Socioeconomic History    Marital status: OTHER   Tobacco Use    Smoking status: Never     Passive exposure: Never    Smokeless tobacco: Never   Vaping Use    Vaping status: Never Used   Substance and Sexual Activity    Alcohol use: Not Currently     Comment: socially    Drug use: Never    Sexual activity: Not Currently     Partners: Male     Birth control/protection: Condom   Other Topics Concern    Caffeine Concern No    Exercise Yes    Seat Belt No    Special Diet No    Stress Concern Yes    Weight Concern Yes     Service No    Blood Transfusions No   Social History Narrative    Works at [employer]    Studying psychology at [employer]               Review of Systems   All other systems reviewed and are negative.      Positive for stated complaint: Arm or Hand Injury - Wrist  Other systems are as noted in HPI.  Constitutional and vital signs reviewed.      All other systems reviewed and negative except as noted above.    Physical Exam     ED Triage Vitals [01/05/25 1211]   /90   Pulse 90   Resp 20   Temp 98.7 °F (37.1 °C)   Temp src  Oral   SpO2 94 %   O2 Device None (Room air)       Current Vitals:   Vital Signs  BP: 160/90  Pulse: 90  Resp: 20  Temp: 98.7 °F (37.1 °C)  Temp src: Oral    Oxygen Therapy  SpO2: 94 %  O2 Device: None (Room air)        Physical Exam  Vitals and nursing note reviewed.   Constitutional:       General: She is not in acute distress.     Appearance: Normal appearance. She is normal weight. She is not ill-appearing, toxic-appearing or diaphoretic.   HENT:      Head: Normocephalic and atraumatic.      Right Ear: External ear normal.      Left Ear: External ear normal.      Nose: Nose normal.   Eyes:      Extraocular Movements: Extraocular movements intact.      Conjunctiva/sclera: Conjunctivae normal.      Pupils: Pupils are equal, round, and reactive to light.   Pulmonary:      Effort: Pulmonary effort is normal. No respiratory distress.   Musculoskeletal:         General: Tenderness and signs of injury present. No swelling or deformity. Normal range of motion.      Cervical back: Normal range of motion and neck supple.   Skin:     General: Skin is warm and dry.      Capillary Refill: Capillary refill takes less than 2 seconds.      Coloration: Skin is not jaundiced or pale.      Findings: No bruising, erythema, lesion or rash.   Neurological:      General: No focal deficit present.      Mental Status: She is alert and oriented to person, place, and time. Mental status is at baseline.      Gait: Gait normal.   Psychiatric:         Mood and Affect: Mood normal.         Behavior: Behavior normal.             ED Course   Labs Reviewed - No data to display  XR WRIST COMPLETE (MIN 3 VIEWS), RIGHT (CPT=73110)   Final Result   PROCEDURE:  XR WRIST COMPLETE (MIN 3 VIEWS), RIGHT (CPT=73110)       TECHNIQUE:  Three views were obtained.       COMPARISON:  None.       INDICATIONS:  Arm or Hand Injury - Wrist       PATIENT STATED HISTORY: (As transcribed by Technologist)  Patient states    posterior right wrist pain x 2 months. No  known injury. Shielded            FINDINGS:     No acute fracture or dislocation.  Joint spaces and bony alignment are    maintained.  No radiopaque foreign body.                         =====   CONCLUSION:  No acute osseous findings.           LOCATION:  [facility]           Dictated by (CST): Dedrick Dia MD on 1/05/2025 at 12:54 PM        Finalized by (CST): Dedrick Dia MD on 1/05/2025 at 12:54 PM                         MDM             Medical Decision Making  24-year-old female presents with acute on chronic right wrist pain that is atraumatic in nature.  Considerations include but not limited to carpal tunnel versus ligamentous strain versus fracture versus dislocation.  Plan  - X ray: right wrist    - velcro wrist splint with spica   - RICE  - refer to orthopaedics/ PCP   - OTC: Tylenol 1g po q 6 hours/ prn.   - rx: ibuprofen 600mg po q8 hours/ prn.    - discharge to home  - return to ED if symptoms worsens     Amount and/or Complexity of Data Reviewed  Radiology: ordered and independent interpretation performed. Decision-making details documented in ED Course.     Details: No fracture or Dislocation based my independent interpretation        Disposition and Plan     Clinical Impression:  1. Sprain of right wrist, initial encounter         Disposition:  Discharge  1/5/2025  1:17 pm    Follow-up:  Rahul Pelletier, PA  [facility] [place] [id number]  [phone]           [facility]  [address] [facility] [address]  [phone]              Medications Prescribed:  Current Discharge Medication List        START taking these medications    Details   ibuprofen 600 MG Oral Tab Take 1 tablet (600 mg total) by mouth every 8 (eight) hours as needed.  Qty: 30 tablet, Refills: 0                 Supplementary Documentation:

## 2025-01-08 ENCOUNTER — OFFICE VISIT (OUTPATIENT)
Facility: CLINIC | Age: 25
End: 2025-01-08
Payer: COMMERCIAL

## 2025-01-08 VITALS
WEIGHT: 293 LBS | SYSTOLIC BLOOD PRESSURE: 128 MMHG | DIASTOLIC BLOOD PRESSURE: 68 MMHG | HEIGHT: 67 IN | HEART RATE: 92 BPM | BODY MASS INDEX: 45.99 KG/M2

## 2025-01-08 DIAGNOSIS — Z30.41 ENCOUNTER FOR SURVEILLANCE OF CONTRACEPTIVE PILLS: Primary | ICD-10-CM

## 2025-01-08 PROCEDURE — 99213 OFFICE O/P EST LOW 20 MIN: CPT | Performed by: OBSTETRICS & GYNECOLOGY

## 2025-01-08 NOTE — PROGRESS NOTES
Shannan Lindsey is a 24 year old female  Patient's last menstrual period was 2024 (approximate).   Chief Complaint   Patient presents with    Contraception     OCP f/u  - No complaints but would like a refill   .  Patient started OCP several months ago, she had cramping that is getting jad. Moving to [place] next silva and would like to continue OCP  OBSTETRICS HISTORY:  OB History    Para Term  AB Living   0 0 0 0 0 0   SAB IAB Ectopic Multiple Live Births   0 0 0 0 0       GYNE HISTORY:  Periods regular monthly    History   Sexual Activity    Sexual activity: Not Currently    Partners: Male    Birth control/ protection: Condom        Pap Date: 23  Pap Result Notes: Negative        MEDICAL HISTORY:  Past Medical History:    LEXI (generalized anxiety disorder)    Transaminitis    2/2 Mononucleosis infection, resolved       SURGICAL HISTORY:  Past Surgical History:   Procedure Laterality Date    Tonsillectomy Bilateral        SOCIAL HISTORY:  Social History     Socioeconomic History    Marital status: OTHER     Spouse name: Not on file    Number of children: Not on file    Years of education: Not on file    Highest education level: Not on file   Occupational History    Not on file   Tobacco Use    Smoking status: Never     Passive exposure: Never    Smokeless tobacco: Never   Vaping Use    Vaping status: Never Used   Substance and Sexual Activity    Alcohol use: Not Currently     Comment: socially    Drug use: Never    Sexual activity: Not Currently     Partners: Male     Birth control/protection: Condom   Other Topics Concern    Caffeine Concern No    Exercise Yes    Seat Belt No    Special Diet No    Stress Concern Yes    Weight Concern Yes     Service No    Blood Transfusions No    Occupational Exposure Not Asked    Hobby Hazards Not Asked    Sleep Concern Not Asked    Back Care Not Asked    Bike Helmet Not Asked    Self-Exams Not Asked   Social History Narrative     Works at [employer]    Studying psychology at [employer]      Social Drivers of Health     Financial Resource Strain: Not on file   Food Insecurity: Not on file   Transportation Needs: Not on file   Physical Activity: Not on file   Stress: Not on file   Social Connections: Not on file   Housing Stability: Not on file       FAMILY HISTORY:  Family History   Problem Relation Age of Onset    Diabetes Mother     Thyroid disease Father     Heart Disease Maternal Grandmother     Breast Cancer Paternal Aunt        MEDICATIONS:    Current Outpatient Medications:     ibuprofen 600 MG Oral Tab, Take 1 tablet (600 mg total) by mouth every 8 (eight) hours as needed., Disp: 30 tablet, Rfl: 0    FLUOXETINE 10 MG Oral Cap, TAKE 3 CAPSULES BY MOUTH DAILY., Disp: 270 capsule, Rfl: 0    Norethin Ace-Eth Estrad-FE (JUNEL FE 1/20) 1-20 MG-MCG Oral Tab, Take 1 tablet by mouth daily., Disp: 84 tablet, Rfl: 3    hydrOXYzine 25 MG Oral Tab, Take 0.5 tablets (12.5 mg total) by mouth 3 (three) times daily as needed for Anxiety., Disp: 30 tablet, Rfl: 0    Tirzepatide-Weight Management (ZEPBOUND) 2.5 MG/0.5ML Subcutaneous Solution Auto-injector, Inject 2.5 mg into the skin once a week. (Patient not taking: Reported on 1/8/2025), Disp: 2 mL, Rfl: 1    Tirzepatide-Weight Management (ZEPBOUND) 5 MG/0.5ML Subcutaneous Solution Auto-injector, Inject 5 mg into the skin once a week. (Patient not taking: Reported on 1/8/2025), Disp: 2 mL, Rfl: 0    FERROUS SULFATE OR, Take 325 mg by mouth in the morning and 325 mg before bedtime. (Patient not taking: Reported on 1/8/2025), Disp: , Rfl:     ALLERGIES:  Allergies[1]      Review of Systems:  Constitutional:  Denies fatigue, night sweats, hot flashes  Eyes:  denies blurred or double vision  Cardiovascular:  denies chest pain or palpitations  Respiratory:  denies shortness of breath  Gastrointestinal:  denies heartburn, abdominal pain, diarrhea or constipation  Genitourinary:  denies dysuria,  incontinence, abnormal vaginal discharge, vaginal itching  Musculoskeletal:  denies back pain.  Skin/Breast:  Denies any breast pain, lumps, or discharge.   Neurological:  denies headaches, extremity weakness or numbness.  Psychiatric: denies depression or anxiety.  Endocrine:   denies excessive thirst or urination.  Heme/Lymph:  denies history of anemia, easy bruising or bleeding.      PHYSICAL EXAM:   Constitutional: well developed, well nourished  Head/Face: normocephalic  Skin/Hair: no unusual rashes or bruises  Extremities: no edema, no cyanosis  Psychiatric:  Oriented to time, place, person and situation. Appropriate mood and affect        Assessment & Plan:  Diagnoses and all orders for this visit:    Encounter for surveillance of contraceptive pills      - patient will check with insurance if will allow to  6 months at  time             [1]   Allergies  Allergen Reactions    Clavulanic Acid HIVES

## 2025-01-23 RX ORDER — NORETHINDRONE ACETATE AND ETHINYL ESTRADIOL 1MG-20(21)
1 KIT ORAL DAILY
Qty: 84 TABLET | Refills: 3 | Status: SHIPPED | OUTPATIENT
Start: 2025-01-23 | End: 2026-01-23

## 2025-05-10 ENCOUNTER — TELEMEDICINE (OUTPATIENT)
Dept: INTERNAL MEDICINE CLINIC | Facility: CLINIC | Age: 25
End: 2025-05-10
Payer: COMMERCIAL

## 2025-05-10 DIAGNOSIS — B96.89 ACUTE BACTERIAL SINUSITIS: Primary | ICD-10-CM

## 2025-05-10 DIAGNOSIS — J01.90 ACUTE BACTERIAL SINUSITIS: Primary | ICD-10-CM

## 2025-05-10 RX ORDER — METHYLPREDNISOLONE 4 MG/1
TABLET ORAL
Qty: 1 EACH | Refills: 0 | Status: SHIPPED | OUTPATIENT
Start: 2025-05-10

## 2025-05-10 RX ORDER — AMOXICILLIN 500 MG/1
500 CAPSULE ORAL 3 TIMES DAILY
Qty: 30 CAPSULE | Refills: 0 | Status: SHIPPED | OUTPATIENT
Start: 2025-05-10 | End: 2025-05-20

## 2025-05-10 NOTE — PATIENT INSTRUCTIONS
VISIT SUMMARY:  Today, you were seen for severe sinus congestion and related symptoms that have been troubling you for the past three days. You reported experiencing sinus pain, pressure, facial swelling, and tenderness, along with nausea, diarrhea, and a low-grade fever. You were previously prescribed medications but were unable to obtain them due to pharmacy issues.    YOUR PLAN:  -ACUTE SINUSITIS: Acute sinusitis is an infection or inflammation of the sinuses, causing symptoms like severe congestion, sinus pain, and pressure. To address this, we have prescribed amoxicillin 500 mg to be taken three times a day for 10 days and a Medrol Dosepak. We will also assist you in transferring your medication to a different pharmacy to ensure you can start your treatment as soon as possible.    INSTRUCTIONS:  Please  your medications from the new pharmacy once the transfer is complete. Take amoxicillin 500 mg three times a day for 10 days and follow the instructions on the Medrol Dosepak. If your symptoms do not improve or worsen, please schedule a follow-up appointment.    Contains text generated by Adela

## 2025-05-10 NOTE — PROGRESS NOTES
Virtual Telephone Check-In    Shannan Lindsey verbally consents to a Virtual/Telephone Check-In visit on 05/10/25.  Patient has been referred to the [facility] website at [email or web address] to review the yearly Consent to Treat document.    Patient understands and accepts financial responsibility for any deductible, co-insurance and/or co-pays associated with this service.    CHIEF COMPLAINT:    Chief Complaint   Patient presents with    Sinus Problem     Started 3 days ago, sinus , congestion , and pressure , sore throat     Stomach Pain     Started yesterday with diarrhea and nausea          HPI: Audio and video visit done.     History of Present Illness  Shannan Lindsey is a 24 year old female who presents with severe sinus congestion and related symptoms.    She has been experiencing severe sinus congestion for the past three days, accompanied by rhinorrhea, fatigue, sinus pain and pressure, otalgia, and pharyngitis. She was evaluated at an urgent care center yesterday where she was prescribed amoxicillin and a Medrol Dosepak, but she was unable to obtain these medications due to pharmacy issues.    She reports the onset of nausea, diarrhea, and a low-grade temperature of 99.8°F yesterday, but denies any actual fevers. Her primary concern remains the sinus pressure, which she describes as significant, with facial swelling and tenderness in the sinus and maxillary areas.    She is currently not on any medications as she was unable to  the prescribed amoxicillin 500 mg TID and Medrol Dosepak from the pharmacy.    She tried flonase for the last few days and has not found it helpful.     REVIEW OF SYSTEMS:  See HPI    Current Medications:    Current Medications[1]    PAST MEDICAL, SOCIAL, AND FAMILY HISTORY:  Tobacco:    History   Smoking Status    Never   Smokeless Tobacco    Never       PHYSICAL EXAM:  LMP 12/16/2024 (Approximate)      DATA:  Results for orders placed or performed in visit on 07/11/24    Iron And Tibc    Collection Time: 07/11/24  7:16 AM   Result Value Ref Range    Iron 30 (L) 50 - 170 ug/dL    Transferrin 288 200 - 360 mg/dL    Total Iron Binding Capacity 429 240 - 450 ug/dL    % Saturation 7 (L) 15 - 50 %   Ferritin    Collection Time: 07/11/24  7:16 AM   Result Value Ref Range    Ferritin 34.5 12.0 - 114.0 ng/mL   TSH W Reflex To Free T4 [E]    Collection Time: 07/11/24  7:16 AM   Result Value Ref Range    TSH 2.970 0.358 - 3.740 mIU/mL   Cortisol [E]    Collection Time: 07/11/24  7:16 AM   Result Value Ref Range    Cortisol 12.2 ug/dL   Connective Tissue Disease (ERIKA) Screen [E]    Collection Time: 07/11/24  7:16 AM   Result Value Ref Range    Expanded CORNEL Antibody Screen, IGG 0.20 <0.7 ug/l    Anti-dsDNA antibody 0.9 <10 IU/mL    Connective Tissue Disease Screen Interpretation Negative Negative   Comp Metabolic Panel (14) [E]    Collection Time: 07/11/24  7:16 AM   Result Value Ref Range    Glucose 98 70 - 99 mg/dL    Sodium 142 136 - 145 mmol/L    Potassium 4.4 3.5 - 5.1 mmol/L    Chloride 110 98 - 112 mmol/L    CO2 26.0 21.0 - 32.0 mmol/L    Anion Gap 6 0 - 18 mmol/L    BUN 9 9 - 23 mg/dL    Creatinine 0.76 0.55 - 1.02 mg/dL    Calcium, Total 9.0 8.5 - 10.1 mg/dL    Calculated Osmolality 293 275 - 295 mOsm/kg    eGFR-Cr 113 >=60 mL/min/1.73m2    AST 11 (L) 15 - 37 U/L    ALT 30 13 - 56 U/L    Alkaline Phosphatase 86 52 - 144 U/L    Bilirubin, Total 0.4 0.1 - 2.0 mg/dL    Total Protein 7.3 6.4 - 8.2 g/dL    Albumin 3.5 3.4 - 5.0 g/dL    Globulin  3.8 2.8 - 4.4 g/dL    A/G Ratio 0.9 (L) 1.0 - 2.0    Patient Fasting for CMP? Yes    Vitamin B12 [E]    Collection Time: 07/11/24  7:16 AM   Result Value Ref Range    Vitamin B12 449 193 - 986 pg/mL   Folic Acid Serum [E]    Collection Time: 07/11/24  7:16 AM   Result Value Ref Range    Folate (Folic Acid) 14.9 >=8.7 ng/mL   CBC W/ DIFFERENTIAL    Collection Time: 07/11/24  7:16 AM   Result Value Ref Range    WBC 10.9 4.0 - 11.0 x10(3) uL    RBC  5.26 3.80 - 5.30 x10(6)uL    HGB 12.2 12.0 - 16.0 g/dL    HCT 39.8 35.0 - 48.0 %    .0 (H) 150.0 - 450.0 10(3)uL    MCV 75.7 (L) 80.0 - 100.0 fL    MCH 23.2 (L) 26.0 - 34.0 pg    MCHC 30.7 (L) 31.0 - 37.0 g/dL    RDW 17.2 %    Neutrophil Absolute Prelim 6.65 1.50 - 7.70 x10 (3) uL    Neutrophil Absolute 6.65 1.50 - 7.70 x10(3) uL    Lymphocyte Absolute 3.25 1.00 - 4.00 x10(3) uL    Monocyte Absolute 0.59 0.10 - 1.00 x10(3) uL    Eosinophil Absolute 0.28 0.00 - 0.70 x10(3) uL    Basophil Absolute 0.07 0.00 - 0.20 x10(3) uL    Immature Granulocyte Absolute 0.06 0.00 - 1.00 x10(3) uL    Neutrophil % 61.0 %    Lymphocyte % 29.8 %    Monocyte % 5.4 %    Eosinophil % 2.6 %    Basophil % 0.6 %    Immature Granulocyte % 0.6 %      Assessment & Plan  Acute Sinusitis  Severe sinus congestion, rhinorrhea, fatigue, sinus pain and pressure, otalgia, and pharyngitis for the last three days. Reports nausea, diarrhea, and low-grade fever of 99.8°F. Sinus and maxillary tenderness on palpation. Facial swelling observed via video visit. Previously evaluated at urgent care and prescribed amoxicillin and Medrol Dosepak, but unable to obtain medications due to pharmacy issues.  - Order amoxicillin 500 mg TID for 10 days.  - Order Medrol Dosepak.  - Assist with transferring medication to a different pharmacy.    Recording duration: 1 minute    Return if symptoms worsen or fail to improve, for establishing with a new primary care.    Carmen Montana MD   Internal Medicine     Pt understands phone/video evaluation is not a substitute for face to face examination or emergency care. Pt advised to go to the ER or call 911 for worsening symptoms or acute distress.      Please note that the following visit was completed using two-way, real-time interactive audio and/or video communication.  This has been done in good peterson to provide continuity of care in the best interest of the provider-patient relationship, due to the ongoing public  health crisis/national emergency and because of restrictions of visitation.  There are limitations of this visit as no physical exam could be performed.  Every conscious effort was taken to allow for sufficient and adequate time.  This billing was spent on reviewing labs, medications, radiology tests and decision making.  Appropriate medical decision-making and tests are ordered as detailed in the plan of care above.              [1]   Current Outpatient Medications   Medication Sig Dispense Refill    amoxicillin 500 MG Oral Cap Take 1 capsule (500 mg total) by mouth 3 (three) times daily for 10 days. 30 capsule 0    methylPREDNISolone (MEDROL) 4 MG Oral Tablet Therapy Pack As directed. 1 each 0    FLUoxetine 10 MG Oral Cap Take 3 capsules (30 mg total) by mouth daily. 270 capsule 3    Norethin Ace-Eth Estrad-FE (JUNEL FE 1/20) 1-20 MG-MCG Oral Tab Take 1 tablet by mouth daily. 84 tablet 3    hydrOXYzine 25 MG Oral Tab Take 0.5 tablets (12.5 mg total) by mouth 3 (three) times daily as needed for Anxiety. 30 tablet 0

## 2025-07-20 DIAGNOSIS — F41.1 GAD (GENERALIZED ANXIETY DISORDER): ICD-10-CM

## 2025-07-20 DIAGNOSIS — F41.0 PANIC ATTACKS: ICD-10-CM

## 2025-07-22 RX ORDER — FLUOXETINE 10 MG/1
30 CAPSULE ORAL DAILY
Qty: 270 CAPSULE | Refills: 0 | Status: SHIPPED | OUTPATIENT
Start: 2025-07-22

## (undated) NOTE — LETTER
Date & Time: 6/30/2023, 5:48 PM  Patient: Sarita Oro  Encounter Provider(s):    Cristino Sy MD       To Whom It May Concern:    Sarita Oro was seen and treated in our department on 6/30/2023. She should not return to work until [address] 7/5/23.     If you have any questions or concerns, please do not hesitate to call.        _____________________________  Physician/APC Signature

## (undated) NOTE — LETTER
Date & Time: 1/5/2025, 1:35 PM  Patient: Shannan Lindsey  Encounter Provider(s):    Pino Mejia PA-C       To Whom It May Concern:    Shannan Lindsey was seen and treated in our department on 1/5/2025. She can return to work.    If you have any questions or concerns, please do not hesitate to call.        _____________________________  Physician/APC Signature